# Patient Record
Sex: FEMALE | Race: BLACK OR AFRICAN AMERICAN | NOT HISPANIC OR LATINO | URBAN - METROPOLITAN AREA
[De-identification: names, ages, dates, MRNs, and addresses within clinical notes are randomized per-mention and may not be internally consistent; named-entity substitution may affect disease eponyms.]

---

## 2022-01-01 ENCOUNTER — INPATIENT (INPATIENT)
Facility: HOSPITAL | Age: 42
LOS: 0 days | DRG: 54 | End: 2022-05-09
Attending: NEUROLOGICAL SURGERY | Admitting: NEUROLOGICAL SURGERY
Payer: COMMERCIAL

## 2022-01-01 ENCOUNTER — EMERGENCY (EMERGENCY)
Facility: HOSPITAL | Age: 42
LOS: 1 days | Discharge: ANOTHER HOSPITAL | End: 2022-01-01
Attending: EMERGENCY MEDICINE
Payer: MEDICAID

## 2022-01-01 VITALS
OXYGEN SATURATION: 100 % | RESPIRATION RATE: 18 BRPM | WEIGHT: 149.91 LBS | SYSTOLIC BLOOD PRESSURE: 111 MMHG | DIASTOLIC BLOOD PRESSURE: 91 MMHG | HEART RATE: 94 BPM

## 2022-01-01 VITALS
RESPIRATION RATE: 12 BRPM | DIASTOLIC BLOOD PRESSURE: 81 MMHG | SYSTOLIC BLOOD PRESSURE: 115 MMHG | OXYGEN SATURATION: 100 % | HEART RATE: 102 BPM | TEMPERATURE: 99 F

## 2022-01-01 VITALS — HEART RATE: 102 BPM | RESPIRATION RATE: 20 BRPM | OXYGEN SATURATION: 99 %

## 2022-01-01 VITALS
SYSTOLIC BLOOD PRESSURE: 83 MMHG | OXYGEN SATURATION: 96 % | DIASTOLIC BLOOD PRESSURE: 52 MMHG | WEIGHT: 134.48 LBS | HEART RATE: 76 BPM | HEIGHT: 62 IN | RESPIRATION RATE: 16 BRPM

## 2022-01-01 DIAGNOSIS — G93.40 ENCEPHALOPATHY, UNSPECIFIED: ICD-10-CM

## 2022-01-01 DIAGNOSIS — Z71.89 OTHER SPECIFIED COUNSELING: ICD-10-CM

## 2022-01-01 DIAGNOSIS — G93.89 OTHER SPECIFIED DISORDERS OF BRAIN: ICD-10-CM

## 2022-01-01 DIAGNOSIS — R53.2 FUNCTIONAL QUADRIPLEGIA: ICD-10-CM

## 2022-01-01 DIAGNOSIS — Z51.5 ENCOUNTER FOR PALLIATIVE CARE: ICD-10-CM

## 2022-01-01 DIAGNOSIS — J96.01 ACUTE RESPIRATORY FAILURE WITH HYPOXIA: ICD-10-CM

## 2022-01-01 DIAGNOSIS — D32.9 BENIGN NEOPLASM OF MENINGES, UNSPECIFIED: ICD-10-CM

## 2022-01-01 LAB
A1C WITH ESTIMATED AVERAGE GLUCOSE RESULT: 5.4 % — SIGNIFICANT CHANGE UP (ref 4–5.6)
ALBUMIN SERPL ELPH-MCNC: 3.5 G/DL — SIGNIFICANT CHANGE UP (ref 3.5–5)
ALBUMIN SERPL ELPH-MCNC: 4.1 G/DL — SIGNIFICANT CHANGE UP (ref 3.3–5)
ALP SERPL-CCNC: 44 U/L — SIGNIFICANT CHANGE UP (ref 40–120)
ALP SERPL-CCNC: 50 U/L — SIGNIFICANT CHANGE UP (ref 40–120)
ALT FLD-CCNC: 18 U/L DA — SIGNIFICANT CHANGE UP (ref 10–60)
ALT FLD-CCNC: 18 U/L — SIGNIFICANT CHANGE UP (ref 10–45)
ANION GAP SERPL CALC-SCNC: 10 MMOL/L — SIGNIFICANT CHANGE UP (ref 5–17)
ANION GAP SERPL CALC-SCNC: 11 MMOL/L — SIGNIFICANT CHANGE UP (ref 5–17)
ANION GAP SERPL CALC-SCNC: 12 MMOL/L — SIGNIFICANT CHANGE UP (ref 5–17)
ANION GAP SERPL CALC-SCNC: 14 MMOL/L — SIGNIFICANT CHANGE UP (ref 5–17)
ANION GAP SERPL CALC-SCNC: 14 MMOL/L — SIGNIFICANT CHANGE UP (ref 5–17)
ANION GAP SERPL CALC-SCNC: 16 MMOL/L — SIGNIFICANT CHANGE UP (ref 5–17)
ANION GAP SERPL CALC-SCNC: 16 MMOL/L — SIGNIFICANT CHANGE UP (ref 5–17)
APTT BLD: 24.2 SEC — LOW (ref 27.5–35.5)
APTT BLD: 24.6 SEC — LOW (ref 27.5–35.5)
APTT BLD: 26.8 SEC — LOW (ref 27.5–35.5)
AST SERPL-CCNC: 30 U/L — SIGNIFICANT CHANGE UP (ref 10–40)
AST SERPL-CCNC: 34 U/L — SIGNIFICANT CHANGE UP (ref 10–40)
BASOPHILS # BLD AUTO: 0.03 K/UL — SIGNIFICANT CHANGE UP (ref 0–0.2)
BASOPHILS # BLD AUTO: 0.04 K/UL — SIGNIFICANT CHANGE UP (ref 0–0.2)
BASOPHILS NFR BLD AUTO: 0.2 % — SIGNIFICANT CHANGE UP (ref 0–2)
BASOPHILS NFR BLD AUTO: 0.4 % — SIGNIFICANT CHANGE UP (ref 0–2)
BILIRUB SERPL-MCNC: 0.4 MG/DL — SIGNIFICANT CHANGE UP (ref 0.2–1.2)
BILIRUB SERPL-MCNC: 0.5 MG/DL — SIGNIFICANT CHANGE UP (ref 0.2–1.2)
BLD GP AB SCN SERPL QL: NEGATIVE — SIGNIFICANT CHANGE UP
BUN SERPL-MCNC: 10 MG/DL — SIGNIFICANT CHANGE UP (ref 7–23)
BUN SERPL-MCNC: 11 MG/DL — SIGNIFICANT CHANGE UP (ref 7–18)
BUN SERPL-MCNC: 7 MG/DL — SIGNIFICANT CHANGE UP (ref 7–23)
BUN SERPL-MCNC: 8 MG/DL — SIGNIFICANT CHANGE UP (ref 7–23)
CALCIUM SERPL-MCNC: 7.4 MG/DL — LOW (ref 8.4–10.5)
CALCIUM SERPL-MCNC: 8.5 MG/DL — SIGNIFICANT CHANGE UP (ref 8.4–10.5)
CALCIUM SERPL-MCNC: 8.8 MG/DL — SIGNIFICANT CHANGE UP (ref 8.4–10.5)
CALCIUM SERPL-MCNC: 8.9 MG/DL — SIGNIFICANT CHANGE UP (ref 8.4–10.5)
CALCIUM SERPL-MCNC: 9 MG/DL — SIGNIFICANT CHANGE UP (ref 8.4–10.5)
CALCIUM SERPL-MCNC: 9.5 MG/DL — SIGNIFICANT CHANGE UP (ref 8.4–10.5)
CALCIUM SERPL-MCNC: 9.6 MG/DL — SIGNIFICANT CHANGE UP (ref 8.4–10.5)
CHLORIDE SERPL-SCNC: 104 MMOL/L — SIGNIFICANT CHANGE UP (ref 96–108)
CHLORIDE SERPL-SCNC: 110 MMOL/L — HIGH (ref 96–108)
CHLORIDE SERPL-SCNC: 121 MMOL/L — HIGH (ref 96–108)
CHLORIDE SERPL-SCNC: 123 MMOL/L — HIGH (ref 96–108)
CHLORIDE SERPL-SCNC: >135 MMOL/L — HIGH (ref 96–108)
CO2 SERPL-SCNC: 11 MMOL/L — LOW (ref 22–31)
CO2 SERPL-SCNC: 11 MMOL/L — LOW (ref 22–31)
CO2 SERPL-SCNC: 12 MMOL/L — LOW (ref 22–31)
CO2 SERPL-SCNC: 12 MMOL/L — LOW (ref 22–31)
CO2 SERPL-SCNC: 14 MMOL/L — LOW (ref 22–31)
CO2 SERPL-SCNC: 19 MMOL/L — LOW (ref 22–31)
CO2 SERPL-SCNC: <10 MMOL/L — CRITICAL LOW (ref 22–31)
CREAT SERPL-MCNC: 0.42 MG/DL — LOW (ref 0.5–1.3)
CREAT SERPL-MCNC: 0.47 MG/DL — LOW (ref 0.5–1.3)
CREAT SERPL-MCNC: 0.53 MG/DL — SIGNIFICANT CHANGE UP (ref 0.5–1.3)
CREAT SERPL-MCNC: 0.75 MG/DL — SIGNIFICANT CHANGE UP (ref 0.5–1.3)
CREAT SERPL-MCNC: 0.77 MG/DL — SIGNIFICANT CHANGE UP (ref 0.5–1.3)
CREAT SERPL-MCNC: 0.83 MG/DL — SIGNIFICANT CHANGE UP (ref 0.5–1.3)
CREAT SERPL-MCNC: 0.95 MG/DL — SIGNIFICANT CHANGE UP (ref 0.5–1.3)
EGFR: 101 ML/MIN/1.73M2 — SIGNIFICANT CHANGE UP
EGFR: 118 ML/MIN/1.73M2 — SIGNIFICANT CHANGE UP
EGFR: 121 ML/MIN/1.73M2 — SIGNIFICANT CHANGE UP
EGFR: 124 ML/MIN/1.73M2 — SIGNIFICANT CHANGE UP
EGFR: 76 ML/MIN/1.73M2 — SIGNIFICANT CHANGE UP
EGFR: 90 ML/MIN/1.73M2 — SIGNIFICANT CHANGE UP
EGFR: 98 ML/MIN/1.73M2 — SIGNIFICANT CHANGE UP
EOSINOPHIL # BLD AUTO: 0 K/UL — SIGNIFICANT CHANGE UP (ref 0–0.5)
EOSINOPHIL # BLD AUTO: 0.01 K/UL — SIGNIFICANT CHANGE UP (ref 0–0.5)
EOSINOPHIL NFR BLD AUTO: 0 % — SIGNIFICANT CHANGE UP (ref 0–6)
EOSINOPHIL NFR BLD AUTO: 0.1 % — SIGNIFICANT CHANGE UP (ref 0–6)
ESTIMATED AVERAGE GLUCOSE: 108 MG/DL — SIGNIFICANT CHANGE UP (ref 68–114)
ETHANOL SERPL-MCNC: <3 MG/DL — SIGNIFICANT CHANGE UP (ref 0–10)
GAS PNL BLDA: SIGNIFICANT CHANGE UP
GAS PNL BLDA: SIGNIFICANT CHANGE UP
GLUCOSE BLDC GLUCOMTR-MCNC: 189 MG/DL — HIGH (ref 70–99)
GLUCOSE BLDC GLUCOMTR-MCNC: 226 MG/DL — HIGH (ref 70–99)
GLUCOSE SERPL-MCNC: 141 MG/DL — HIGH (ref 70–99)
GLUCOSE SERPL-MCNC: 160 MG/DL — HIGH (ref 70–99)
GLUCOSE SERPL-MCNC: 189 MG/DL — HIGH (ref 70–99)
GLUCOSE SERPL-MCNC: 201 MG/DL — HIGH (ref 70–99)
GLUCOSE SERPL-MCNC: 260 MG/DL — HIGH (ref 70–99)
GLUCOSE SERPL-MCNC: 267 MG/DL — HIGH (ref 70–99)
GLUCOSE SERPL-MCNC: 528 MG/DL — CRITICAL HIGH (ref 70–99)
HCG SERPL-ACNC: <1 MIU/ML — SIGNIFICANT CHANGE UP
HCG SERPL-ACNC: <2 MIU/ML — SIGNIFICANT CHANGE UP
HCT VFR BLD CALC: 31.3 % — LOW (ref 34.5–45)
HCT VFR BLD CALC: 33.6 % — LOW (ref 34.5–45)
HCT VFR BLD CALC: 39.9 % — SIGNIFICANT CHANGE UP (ref 34.5–45)
HGB BLD-MCNC: 10.4 G/DL — LOW (ref 11.5–15.5)
HGB BLD-MCNC: 11.1 G/DL — LOW (ref 11.5–15.5)
HGB BLD-MCNC: 12.9 G/DL — SIGNIFICANT CHANGE UP (ref 11.5–15.5)
IMM GRANULOCYTES NFR BLD AUTO: 0.5 % — SIGNIFICANT CHANGE UP (ref 0–1.5)
IMM GRANULOCYTES NFR BLD AUTO: 0.6 % — SIGNIFICANT CHANGE UP (ref 0–1.5)
INR BLD: 1.03 RATIO — SIGNIFICANT CHANGE UP (ref 0.88–1.16)
INR BLD: 1.03 RATIO — SIGNIFICANT CHANGE UP (ref 0.88–1.16)
INR BLD: 1.06 RATIO — SIGNIFICANT CHANGE UP (ref 0.88–1.16)
LYMPHOCYTES # BLD AUTO: 0.65 K/UL — LOW (ref 1–3.3)
LYMPHOCYTES # BLD AUTO: 1.55 K/UL — SIGNIFICANT CHANGE UP (ref 1–3.3)
LYMPHOCYTES # BLD AUTO: 19.6 % — SIGNIFICANT CHANGE UP (ref 13–44)
LYMPHOCYTES # BLD AUTO: 3.9 % — LOW (ref 13–44)
MAGNESIUM SERPL-MCNC: 1.4 MG/DL — LOW (ref 1.6–2.6)
MAGNESIUM SERPL-MCNC: 2.1 MG/DL — SIGNIFICANT CHANGE UP (ref 1.6–2.6)
MAGNESIUM SERPL-MCNC: 2.2 MG/DL — SIGNIFICANT CHANGE UP (ref 1.6–2.6)
MAGNESIUM SERPL-MCNC: 2.3 MG/DL — SIGNIFICANT CHANGE UP (ref 1.6–2.6)
MCHC RBC-ENTMCNC: 28.5 PG — SIGNIFICANT CHANGE UP (ref 27–34)
MCHC RBC-ENTMCNC: 28.8 PG — SIGNIFICANT CHANGE UP (ref 27–34)
MCHC RBC-ENTMCNC: 28.8 PG — SIGNIFICANT CHANGE UP (ref 27–34)
MCHC RBC-ENTMCNC: 32.3 GM/DL — SIGNIFICANT CHANGE UP (ref 32–36)
MCHC RBC-ENTMCNC: 33 GM/DL — SIGNIFICANT CHANGE UP (ref 32–36)
MCHC RBC-ENTMCNC: 33.2 GM/DL — SIGNIFICANT CHANGE UP (ref 32–36)
MCV RBC AUTO: 86.7 FL — SIGNIFICANT CHANGE UP (ref 80–100)
MCV RBC AUTO: 87.3 FL — SIGNIFICANT CHANGE UP (ref 80–100)
MCV RBC AUTO: 88.1 FL — SIGNIFICANT CHANGE UP (ref 80–100)
MONOCYTES # BLD AUTO: 0.31 K/UL — SIGNIFICANT CHANGE UP (ref 0–0.9)
MONOCYTES # BLD AUTO: 0.57 K/UL — SIGNIFICANT CHANGE UP (ref 0–0.9)
MONOCYTES NFR BLD AUTO: 3.4 % — SIGNIFICANT CHANGE UP (ref 2–14)
MONOCYTES NFR BLD AUTO: 3.9 % — SIGNIFICANT CHANGE UP (ref 2–14)
NEUTROPHILS # BLD AUTO: 15.29 K/UL — HIGH (ref 1.8–7.4)
NEUTROPHILS # BLD AUTO: 5.97 K/UL — SIGNIFICANT CHANGE UP (ref 1.8–7.4)
NEUTROPHILS NFR BLD AUTO: 75.4 % — SIGNIFICANT CHANGE UP (ref 43–77)
NEUTROPHILS NFR BLD AUTO: 92 % — HIGH (ref 43–77)
NRBC # BLD: 0 /100 WBCS — SIGNIFICANT CHANGE UP (ref 0–0)
OSMOLALITY SERPL: 372 MOSMOL/KG — HIGH (ref 275–300)
PCP SPEC-MCNC: SIGNIFICANT CHANGE UP
PHOSPHATE SERPL-MCNC: 0.7 MG/DL — CRITICAL LOW (ref 2.5–4.5)
PHOSPHATE SERPL-MCNC: 0.8 MG/DL — CRITICAL LOW (ref 2.5–4.5)
PHOSPHATE SERPL-MCNC: 1.1 MG/DL — LOW (ref 2.5–4.5)
PLATELET # BLD AUTO: 234 K/UL — SIGNIFICANT CHANGE UP (ref 150–400)
PLATELET # BLD AUTO: 266 K/UL — SIGNIFICANT CHANGE UP (ref 150–400)
PLATELET # BLD AUTO: 296 K/UL — SIGNIFICANT CHANGE UP (ref 150–400)
POTASSIUM SERPL-MCNC: 3.2 MMOL/L — LOW (ref 3.5–5.3)
POTASSIUM SERPL-MCNC: 3.2 MMOL/L — LOW (ref 3.5–5.3)
POTASSIUM SERPL-MCNC: 3.3 MMOL/L — LOW (ref 3.5–5.3)
POTASSIUM SERPL-MCNC: 3.4 MMOL/L — LOW (ref 3.5–5.3)
POTASSIUM SERPL-MCNC: 3.5 MMOL/L — SIGNIFICANT CHANGE UP (ref 3.5–5.3)
POTASSIUM SERPL-MCNC: 4.6 MMOL/L — SIGNIFICANT CHANGE UP (ref 3.5–5.3)
POTASSIUM SERPL-MCNC: 6.2 MMOL/L — CRITICAL HIGH (ref 3.5–5.3)
POTASSIUM SERPL-SCNC: 3.2 MMOL/L — LOW (ref 3.5–5.3)
POTASSIUM SERPL-SCNC: 3.2 MMOL/L — LOW (ref 3.5–5.3)
POTASSIUM SERPL-SCNC: 3.3 MMOL/L — LOW (ref 3.5–5.3)
POTASSIUM SERPL-SCNC: 3.4 MMOL/L — LOW (ref 3.5–5.3)
POTASSIUM SERPL-SCNC: 3.5 MMOL/L — SIGNIFICANT CHANGE UP (ref 3.5–5.3)
POTASSIUM SERPL-SCNC: 4.6 MMOL/L — SIGNIFICANT CHANGE UP (ref 3.5–5.3)
POTASSIUM SERPL-SCNC: 6.2 MMOL/L — CRITICAL HIGH (ref 3.5–5.3)
PROT SERPL-MCNC: 6.4 G/DL — SIGNIFICANT CHANGE UP (ref 6–8.3)
PROT SERPL-MCNC: 7 G/DL — SIGNIFICANT CHANGE UP (ref 6–8.3)
PROTHROM AB SERPL-ACNC: 11.8 SEC — SIGNIFICANT CHANGE UP (ref 10.5–13.4)
PROTHROM AB SERPL-ACNC: 12.2 SEC — SIGNIFICANT CHANGE UP (ref 10.5–13.4)
PROTHROM AB SERPL-ACNC: 12.3 SEC — SIGNIFICANT CHANGE UP (ref 10.5–13.4)
RBC # BLD: 3.61 M/UL — LOW (ref 3.8–5.2)
RBC # BLD: 3.85 M/UL — SIGNIFICANT CHANGE UP (ref 3.8–5.2)
RBC # BLD: 4.53 M/UL — SIGNIFICANT CHANGE UP (ref 3.8–5.2)
RBC # FLD: 13.2 % — SIGNIFICANT CHANGE UP (ref 10.3–14.5)
RH IG SCN BLD-IMP: POSITIVE — SIGNIFICANT CHANGE UP
SARS-COV-2 RNA SPEC QL NAA+PROBE: SIGNIFICANT CHANGE UP
SARS-COV-2 RNA SPEC QL NAA+PROBE: SIGNIFICANT CHANGE UP
SODIUM SERPL-SCNC: 133 MMOL/L — LOW (ref 135–145)
SODIUM SERPL-SCNC: 140 MMOL/L — SIGNIFICANT CHANGE UP (ref 135–145)
SODIUM SERPL-SCNC: 147 MMOL/L — HIGH (ref 135–145)
SODIUM SERPL-SCNC: 149 MMOL/L — HIGH (ref 135–145)
SODIUM SERPL-SCNC: >170 MMOL/L — CRITICAL HIGH (ref 135–145)
SP GR UR STRIP: 1.01 — LOW (ref 1.01–1.02)
WBC # BLD: 16.44 K/UL — HIGH (ref 3.8–10.5)
WBC # BLD: 16.63 K/UL — HIGH (ref 3.8–10.5)
WBC # BLD: 7.92 K/UL — SIGNIFICANT CHANGE UP (ref 3.8–10.5)
WBC # FLD AUTO: 16.44 K/UL — HIGH (ref 3.8–10.5)
WBC # FLD AUTO: 16.63 K/UL — HIGH (ref 3.8–10.5)
WBC # FLD AUTO: 7.92 K/UL — SIGNIFICANT CHANGE UP (ref 3.8–10.5)

## 2022-01-01 PROCEDURE — 99223 1ST HOSP IP/OBS HIGH 75: CPT

## 2022-01-01 PROCEDURE — 82962 GLUCOSE BLOOD TEST: CPT

## 2022-01-01 PROCEDURE — 95718 EEG PHYS/QHP 2-12 HR W/VEEG: CPT

## 2022-01-01 PROCEDURE — 36620 INSERTION CATHETER ARTERY: CPT

## 2022-01-01 PROCEDURE — 71045 X-RAY EXAM CHEST 1 VIEW: CPT | Mod: 26

## 2022-01-01 PROCEDURE — 71045 X-RAY EXAM CHEST 1 VIEW: CPT

## 2022-01-01 PROCEDURE — 96375 TX/PRO/DX INJ NEW DRUG ADDON: CPT | Mod: XU

## 2022-01-01 PROCEDURE — 86901 BLOOD TYPING SEROLOGIC RH(D): CPT

## 2022-01-01 PROCEDURE — 85025 COMPLETE CBC W/AUTO DIFF WBC: CPT

## 2022-01-01 PROCEDURE — 99291 CRITICAL CARE FIRST HOUR: CPT | Mod: 25

## 2022-01-01 PROCEDURE — 70450 CT HEAD/BRAIN W/O DYE: CPT | Mod: 26,MA,77

## 2022-01-01 PROCEDURE — 70498 CT ANGIOGRAPHY NECK: CPT | Mod: 26,MA

## 2022-01-01 PROCEDURE — 70496 CT ANGIOGRAPHY HEAD: CPT | Mod: 26,MA

## 2022-01-01 PROCEDURE — 84702 CHORIONIC GONADOTROPIN TEST: CPT

## 2022-01-01 PROCEDURE — 99291 CRITICAL CARE FIRST HOUR: CPT

## 2022-01-01 PROCEDURE — 31500 INSERT EMERGENCY AIRWAY: CPT

## 2022-01-01 PROCEDURE — 85610 PROTHROMBIN TIME: CPT

## 2022-01-01 PROCEDURE — 86900 BLOOD TYPING SEROLOGIC ABO: CPT

## 2022-01-01 PROCEDURE — 70450 CT HEAD/BRAIN W/O DYE: CPT | Mod: MA

## 2022-01-01 PROCEDURE — 93010 ELECTROCARDIOGRAM REPORT: CPT

## 2022-01-01 PROCEDURE — 96374 THER/PROPH/DIAG INJ IV PUSH: CPT | Mod: XU

## 2022-01-01 PROCEDURE — 93970 EXTREMITY STUDY: CPT | Mod: 26

## 2022-01-01 PROCEDURE — 70450 CT HEAD/BRAIN W/O DYE: CPT | Mod: 26

## 2022-01-01 PROCEDURE — 85730 THROMBOPLASTIN TIME PARTIAL: CPT

## 2022-01-01 PROCEDURE — 36415 COLL VENOUS BLD VENIPUNCTURE: CPT

## 2022-01-01 PROCEDURE — 80307 DRUG TEST PRSMV CHEM ANLYZR: CPT

## 2022-01-01 PROCEDURE — 87635 SARS-COV-2 COVID-19 AMP PRB: CPT

## 2022-01-01 PROCEDURE — 80053 COMPREHEN METABOLIC PANEL: CPT

## 2022-01-01 PROCEDURE — 70498 CT ANGIOGRAPHY NECK: CPT | Mod: MA

## 2022-01-01 PROCEDURE — 70496 CT ANGIOGRAPHY HEAD: CPT | Mod: MA

## 2022-01-01 PROCEDURE — 93005 ELECTROCARDIOGRAM TRACING: CPT

## 2022-01-01 PROCEDURE — 86850 RBC ANTIBODY SCREEN: CPT

## 2022-01-01 RX ORDER — MORPHINE SULFATE 50 MG/1
2 CAPSULE, EXTENDED RELEASE ORAL
Refills: 0 | Status: DISCONTINUED | OUTPATIENT
Start: 2022-01-01 | End: 2022-01-01

## 2022-01-01 RX ORDER — INSULIN LISPRO 100/ML
VIAL (ML) SUBCUTANEOUS EVERY 6 HOURS
Refills: 0 | Status: DISCONTINUED | OUTPATIENT
Start: 2022-01-01 | End: 2022-01-01

## 2022-01-01 RX ORDER — NOREPINEPHRINE BITARTRATE/D5W 8 MG/250ML
0.05 PLASTIC BAG, INJECTION (ML) INTRAVENOUS
Qty: 8 | Refills: 0 | Status: DISCONTINUED | OUTPATIENT
Start: 2022-01-01 | End: 2022-05-12

## 2022-01-01 RX ORDER — SODIUM CHLORIDE 9 MG/ML
1000 INJECTION, SOLUTION INTRAVENOUS
Refills: 0 | Status: DISCONTINUED | OUTPATIENT
Start: 2022-01-01 | End: 2022-01-01

## 2022-01-01 RX ORDER — CHLORHEXIDINE GLUCONATE 213 G/1000ML
15 SOLUTION TOPICAL EVERY 12 HOURS
Refills: 0 | Status: DISCONTINUED | OUTPATIENT
Start: 2022-01-01 | End: 2022-01-01

## 2022-01-01 RX ORDER — ROCURONIUM BROMIDE 10 MG/ML
70 VIAL (ML) INTRAVENOUS ONCE
Refills: 0 | Status: COMPLETED | OUTPATIENT
Start: 2022-01-01 | End: 2022-01-01

## 2022-01-01 RX ORDER — ETOMIDATE 2 MG/ML
20 INJECTION INTRAVENOUS ONCE
Refills: 0 | Status: COMPLETED | OUTPATIENT
Start: 2022-01-01 | End: 2022-01-01

## 2022-01-01 RX ORDER — PHENYLEPHRINE HYDROCHLORIDE 10 MG/ML
0.2 INJECTION INTRAVENOUS
Qty: 40 | Refills: 0 | Status: DISCONTINUED | OUTPATIENT
Start: 2022-01-01 | End: 2022-01-01

## 2022-01-01 RX ORDER — PHENYLEPHRINE HYDROCHLORIDE 10 MG/ML
4.7 INJECTION INTRAVENOUS
Qty: 160 | Refills: 0 | Status: DISCONTINUED | OUTPATIENT
Start: 2022-01-01 | End: 2022-01-01

## 2022-01-01 RX ORDER — SODIUM CHLORIDE 9 MG/ML
2000 INJECTION INTRAMUSCULAR; INTRAVENOUS; SUBCUTANEOUS ONCE
Refills: 0 | Status: COMPLETED | OUTPATIENT
Start: 2022-01-01 | End: 2022-01-01

## 2022-01-01 RX ORDER — CHLORHEXIDINE GLUCONATE 213 G/1000ML
1 SOLUTION TOPICAL
Refills: 0 | Status: DISCONTINUED | OUTPATIENT
Start: 2022-01-01 | End: 2022-01-01

## 2022-01-01 RX ORDER — SODIUM CHLORIDE 9 MG/ML
1000 INJECTION INTRAMUSCULAR; INTRAVENOUS; SUBCUTANEOUS
Refills: 0 | Status: DISCONTINUED | OUTPATIENT
Start: 2022-01-01 | End: 2022-01-01

## 2022-01-01 RX ORDER — DEXAMETHASONE 0.5 MG/5ML
10 ELIXIR ORAL ONCE
Refills: 0 | Status: DISCONTINUED | OUTPATIENT
Start: 2022-01-01 | End: 2022-01-01

## 2022-01-01 RX ORDER — DEXAMETHASONE 0.5 MG/5ML
6 ELIXIR ORAL ONCE
Refills: 0 | Status: COMPLETED | OUTPATIENT
Start: 2022-01-01 | End: 2022-01-01

## 2022-01-01 RX ORDER — PHENYLEPHRINE HYDROCHLORIDE 10 MG/ML
5.1 INJECTION INTRAVENOUS
Qty: 160 | Refills: 0 | Status: DISCONTINUED | OUTPATIENT
Start: 2022-01-01 | End: 2022-01-01

## 2022-01-01 RX ORDER — MANNITOL
25 POWDER (GRAM) MISCELLANEOUS ONCE
Refills: 0 | Status: COMPLETED | OUTPATIENT
Start: 2022-01-01 | End: 2022-01-01

## 2022-01-01 RX ORDER — DEXTROSE 50 % IN WATER 50 %
15 SYRINGE (ML) INTRAVENOUS ONCE
Refills: 0 | Status: DISCONTINUED | OUTPATIENT
Start: 2022-01-01 | End: 2022-01-01

## 2022-01-01 RX ORDER — DEXAMETHASONE 0.5 MG/5ML
10 ELIXIR ORAL EVERY 6 HOURS
Refills: 0 | Status: DISCONTINUED | OUTPATIENT
Start: 2022-01-01 | End: 2022-01-01

## 2022-01-01 RX ORDER — POTASSIUM PHOSPHATE, MONOBASIC POTASSIUM PHOSPHATE, DIBASIC 236; 224 MG/ML; MG/ML
30 INJECTION, SOLUTION INTRAVENOUS ONCE
Refills: 0 | Status: COMPLETED | OUTPATIENT
Start: 2022-01-01 | End: 2022-01-01

## 2022-01-01 RX ORDER — DEXTROSE 50 % IN WATER 50 %
25 SYRINGE (ML) INTRAVENOUS ONCE
Refills: 0 | Status: DISCONTINUED | OUTPATIENT
Start: 2022-01-01 | End: 2022-01-01

## 2022-01-01 RX ORDER — LEVETIRACETAM 250 MG/1
1000 TABLET, FILM COATED ORAL ONCE
Refills: 0 | Status: COMPLETED | OUTPATIENT
Start: 2022-01-01 | End: 2022-01-01

## 2022-01-01 RX ORDER — GLUCAGON INJECTION, SOLUTION 0.5 MG/.1ML
1 INJECTION, SOLUTION SUBCUTANEOUS ONCE
Refills: 0 | Status: DISCONTINUED | OUTPATIENT
Start: 2022-01-01 | End: 2022-01-01

## 2022-01-01 RX ORDER — DEXTROSE 50 % IN WATER 50 %
12.5 SYRINGE (ML) INTRAVENOUS ONCE
Refills: 0 | Status: DISCONTINUED | OUTPATIENT
Start: 2022-01-01 | End: 2022-01-01

## 2022-01-01 RX ORDER — SODIUM CHLORIDE 5 G/100ML
1000 INJECTION, SOLUTION INTRAVENOUS
Refills: 0 | Status: DISCONTINUED | OUTPATIENT
Start: 2022-01-01 | End: 2022-01-01

## 2022-01-01 RX ORDER — SODIUM CHLORIDE 9 MG/ML
1000 INJECTION INTRAMUSCULAR; INTRAVENOUS; SUBCUTANEOUS ONCE
Refills: 0 | Status: COMPLETED | OUTPATIENT
Start: 2022-01-01 | End: 2022-01-01

## 2022-01-01 RX ORDER — DEXAMETHASONE 0.5 MG/5ML
10 ELIXIR ORAL ONCE
Refills: 0 | Status: COMPLETED | OUTPATIENT
Start: 2022-01-01 | End: 2022-01-01

## 2022-01-01 RX ORDER — NOREPINEPHRINE BITARTRATE/D5W 8 MG/250ML
0.1 PLASTIC BAG, INJECTION (ML) INTRAVENOUS
Qty: 8 | Refills: 0 | Status: DISCONTINUED | OUTPATIENT
Start: 2022-01-01 | End: 2022-01-01

## 2022-01-01 RX ORDER — SODIUM CHLORIDE 5 G/100ML
50 INJECTION, SOLUTION INTRAVENOUS
Refills: 0 | Status: DISCONTINUED | OUTPATIENT
Start: 2022-01-01 | End: 2022-01-01

## 2022-01-01 RX ORDER — VASOPRESSIN 20 [USP'U]/ML
0.04 INJECTION INTRAVENOUS
Qty: 50 | Refills: 0 | Status: DISCONTINUED | OUTPATIENT
Start: 2022-01-01 | End: 2022-01-01

## 2022-01-01 RX ORDER — SODIUM CHLORIDE 9 MG/ML
1500 INJECTION, SOLUTION INTRAVENOUS
Refills: 0 | Status: COMPLETED | OUTPATIENT
Start: 2022-01-01 | End: 2022-01-01

## 2022-01-01 RX ORDER — PROPOFOL 10 MG/ML
40 INJECTION, EMULSION INTRAVENOUS
Qty: 1000 | Refills: 0 | Status: DISCONTINUED | OUTPATIENT
Start: 2022-01-01 | End: 2022-01-01

## 2022-01-01 RX ORDER — LEVETIRACETAM 250 MG/1
1000 TABLET, FILM COATED ORAL EVERY 12 HOURS
Refills: 0 | Status: DISCONTINUED | OUTPATIENT
Start: 2022-01-01 | End: 2022-01-01

## 2022-01-01 RX ORDER — ROBINUL 0.2 MG/ML
0.4 INJECTION INTRAMUSCULAR; INTRAVENOUS EVERY 6 HOURS
Refills: 0 | Status: DISCONTINUED | OUTPATIENT
Start: 2022-01-01 | End: 2022-01-01

## 2022-01-01 RX ORDER — PANTOPRAZOLE SODIUM 20 MG/1
40 TABLET, DELAYED RELEASE ORAL DAILY
Refills: 0 | Status: DISCONTINUED | OUTPATIENT
Start: 2022-01-01 | End: 2022-01-01

## 2022-01-01 RX ORDER — SENNA PLUS 8.6 MG/1
2 TABLET ORAL AT BEDTIME
Refills: 0 | Status: DISCONTINUED | OUTPATIENT
Start: 2022-01-01 | End: 2022-01-01

## 2022-01-01 RX ORDER — DEXAMETHASONE 0.5 MG/5ML
10 ELIXIR ORAL EVERY 4 HOURS
Refills: 0 | Status: DISCONTINUED | OUTPATIENT
Start: 2022-01-01 | End: 2022-01-01

## 2022-01-01 RX ADMIN — VASOPRESSIN 2.4 UNIT(S)/MIN: 20 INJECTION INTRAVENOUS at 07:35

## 2022-01-01 RX ADMIN — Medication 102 MILLIGRAM(S): at 09:56

## 2022-01-01 RX ADMIN — Medication 6 MILLIGRAM(S): at 15:50

## 2022-01-01 RX ADMIN — Medication 2: at 00:10

## 2022-01-01 RX ADMIN — POTASSIUM PHOSPHATE, MONOBASIC POTASSIUM PHOSPHATE, DIBASIC 83.33 MILLIMOLE(S): 236; 224 INJECTION, SOLUTION INTRAVENOUS at 01:33

## 2022-01-01 RX ADMIN — SODIUM CHLORIDE 1000 MILLILITER(S): 9 INJECTION INTRAMUSCULAR; INTRAVENOUS; SUBCUTANEOUS at 01:06

## 2022-01-01 RX ADMIN — SODIUM CHLORIDE 1500 MILLILITER(S): 9 INJECTION, SOLUTION INTRAVENOUS at 11:47

## 2022-01-01 RX ADMIN — CHLORHEXIDINE GLUCONATE 1 APPLICATION(S): 213 SOLUTION TOPICAL at 23:42

## 2022-01-01 RX ADMIN — Medication 10 MILLIGRAM(S): at 18:00

## 2022-01-01 RX ADMIN — SODIUM CHLORIDE 2000 MILLILITER(S): 9 INJECTION INTRAMUSCULAR; INTRAVENOUS; SUBCUTANEOUS at 16:45

## 2022-01-01 RX ADMIN — Medication 500 GRAM(S): at 18:51

## 2022-01-01 RX ADMIN — PROPOFOL 16.3 MICROGRAM(S)/KG/MIN: 10 INJECTION, EMULSION INTRAVENOUS at 07:35

## 2022-01-01 RX ADMIN — Medication 10 MILLIGRAM(S): at 18:30

## 2022-01-01 RX ADMIN — Medication 2: at 11:54

## 2022-01-01 RX ADMIN — SODIUM CHLORIDE 500 MILLILITER(S): 5 INJECTION, SOLUTION INTRAVENOUS at 18:50

## 2022-01-01 RX ADMIN — PROPOFOL 16.3 MICROGRAM(S)/KG/MIN: 10 INJECTION, EMULSION INTRAVENOUS at 02:11

## 2022-01-01 RX ADMIN — CHLORHEXIDINE GLUCONATE 15 MILLILITER(S): 213 SOLUTION TOPICAL at 05:18

## 2022-01-01 RX ADMIN — Medication 12.8 MICROGRAM(S)/KG/MIN: at 19:38

## 2022-01-01 RX ADMIN — LEVETIRACETAM 400 MILLIGRAM(S): 250 TABLET, FILM COATED ORAL at 17:14

## 2022-01-01 RX ADMIN — Medication 102 MILLIGRAM(S): at 07:15

## 2022-01-01 RX ADMIN — PANTOPRAZOLE SODIUM 40 MILLIGRAM(S): 20 TABLET, DELAYED RELEASE ORAL at 11:47

## 2022-01-01 RX ADMIN — Medication 2 MILLIGRAM(S): at 15:25

## 2022-01-01 RX ADMIN — LEVETIRACETAM 400 MILLIGRAM(S): 250 TABLET, FILM COATED ORAL at 05:18

## 2022-01-01 RX ADMIN — Medication 102 MILLIGRAM(S): at 03:15

## 2022-01-01 RX ADMIN — Medication 102 MILLIGRAM(S): at 14:09

## 2022-01-01 RX ADMIN — Medication 70 MILLIGRAM(S): at 16:18

## 2022-01-01 RX ADMIN — VASOPRESSIN 2.4 UNIT(S)/MIN: 20 INJECTION INTRAVENOUS at 06:58

## 2022-01-01 RX ADMIN — SODIUM CHLORIDE 2000 MILLILITER(S): 9 INJECTION INTRAMUSCULAR; INTRAVENOUS; SUBCUTANEOUS at 15:25

## 2022-01-01 RX ADMIN — Medication 5.72 MICROGRAM(S)/KG/MIN: at 18:12

## 2022-01-01 RX ADMIN — SODIUM CHLORIDE 75 MILLILITER(S): 5 INJECTION, SOLUTION INTRAVENOUS at 23:41

## 2022-01-01 RX ADMIN — ETOMIDATE 20 MILLIGRAM(S): 2 INJECTION INTRAVENOUS at 16:17

## 2022-01-01 RX ADMIN — LEVETIRACETAM 400 MILLIGRAM(S): 250 TABLET, FILM COATED ORAL at 18:53

## 2022-01-01 RX ADMIN — Medication 102 MILLIGRAM(S): at 23:49

## 2022-01-01 RX ADMIN — PHENYLEPHRINE HYDROCHLORIDE 59.9 MICROGRAM(S)/KG/MIN: 10 INJECTION INTRAVENOUS at 07:35

## 2022-01-01 RX ADMIN — PHENYLEPHRINE HYDROCHLORIDE 59.9 MICROGRAM(S)/KG/MIN: 10 INJECTION INTRAVENOUS at 06:32

## 2022-01-01 RX ADMIN — PHENYLEPHRINE HYDROCHLORIDE 5.1 MICROGRAM(S)/KG/MIN: 10 INJECTION INTRAVENOUS at 22:55

## 2022-05-08 NOTE — ED PROVIDER NOTE - OBJECTIVE STATEMENT
42 y/o woman, h/o possible migraine headaches (validity of Dx uncertain), BIB EMS for possible new onset seizure just prior to arrival, as per Pt's close friend, Mary Faustin, who was visiting Pt from out of town, she was having headache this morning, EMS called to house but she refused medical attention, then just prior to arrival to ED Ms. Faustin found Pt lying on her bed snoring heavily with AMS and vomiting, urinary incontinence, after she had just stepped out of the room for about 3 minutes.  No shaking witnessed but Ms. Faustin says that Pt was very "tensed up" in the fetal position on the bed.  There was no evidence of trauma, alcohol/drug use.  No recent fever/focal weakness/numbness.  No known h/o seizure.

## 2022-05-08 NOTE — PROGRESS NOTE ADULT - SUBJECTIVE AND OBJECTIVE BOX
HPI:  41F past medical history of migraines who presented with a witnessed seizure this morning. No evidence of trauma. Was intubated at OSH for cheyne-sokes respirations and placed on levo for hypotension. CT showed large anterior cranial fossa mass with edema. Patient has poor neurologic exam consistent with brain death vs. active seizures. Being admitted to neurosurgical ICU for further care.  (08 May 2022 20:16)    On admission, the patient was:  GCS: 3T    *** HIGH RISK OF DVT PRESENT ON ADMISSION ***    VITALS:  T(C): --  HR:  (82 - 112)  BP:  (60/36 - 119/91)  ABP: --  RR:  (16 - 22)  SpO2:  (100% - 100%)  Wt(kg): --  Device: Avea, Mode: AC/ CMV (Assist Control/ Continuous Mandatory Ventilation), RR (machine): 16, TV (machine): 400, FiO2: 100, PEEP: 5, ITime: 1, MAP: 9, PIP: 18    LABS:  Na: 140 (05-08 @ 18:25)  K: 3.5 (05-08 @ 18:25)  Cl: 110 (05-08 @ 18:25)  CO2: 14 (05-08 @ 18:25)  BUN: 8 (05-08 @ 18:25)  Cr: 0.42 (05-08 @ 18:25)  Glu: 189(05-08 @ 18:25)    Hgb: 11.1 (05-08 @ 18:25)  Hct: 33.6 (05-08 @ 18:25)  WBC: 16.63 (05-08 @ 18:25)  Plt: 234 (05-08 @ 18:25)  PT: 12.2 (05-08 @ 18:25)  INR: 1.06 (05-08 @ 18:25)  aPTT: 24.2 (05-08 @ 18:25)    IMAGING:   Recent imaging studies were reviewed.    MEDICATIONS:  bisacodyl 5 milliGRAM(s) Oral daily PRN  chlorhexidine 0.12% Liquid 15 milliLiter(s) Oral Mucosa every 12 hours  dexAMETHasone  Injectable 10 milliGRAM(s) IV Push Once  dexAMETHasone  IVPB 10 milliGRAM(s) IV Intermittent every 4 hours  norepinephrine Infusion 0.1 MICROgram(s)/kG/Min IV Continuous <Continuous>  pantoprazole  Injectable 40 milliGRAM(s) IV Push daily  senna 2 Tablet(s) Oral at bedtime PRN  sodium chloride 3%. 50 milliLiter(s) IV Continuous <Continuous>    EXAMINATION:  General:  comatose  HEENT:  MMM  Neuro:  does not open eyes, does not follow, no corneals, no cough, no gag, flaccid x 4, intermittently over-breathes  Cards:  RRR  Respiratory:  intubated  Adomen:  soft  Extremities:  no edema  Skin:  warm/dry

## 2022-05-08 NOTE — ED PROVIDER NOTE - OBJECTIVE STATEMENT
MRN [id number]    40 y/o woman, h/o possible migraine headaches (validity of Dx uncertain), BIB EMS for possible new onset seizure just prior to arrival, as per Pt's close friend, Mary Faustin, who was visiting Pt from out of town, she was having headache this morning, EMS called to house but she refused medical attention, then just prior to arrival to ED Ms. Faustin found Pt lying on her bed snoring heavily with AMS and vomiting, urinary incontinence, after she had just stepped out of the room for about 3 minutes. No shaking witnessed but Ms. Faustin says that Pt was very "tensed up" in the fetal position on the bed. There was no evidence of trauma, alcohol/drug use. No recent fever/focal weakness/numbness. No known h/o seizure.    [facility] CT: 5.8 X 4.8 X 3.6 CM MASS ALONG THE FLOOR OF THE ANTERIOR CRANIAL FOSSA EXTENDING INTO THE INTERHEMISPHERIC FISSURE. MODERATE TO LARGE AMOUNT OF EDEMA WITHIN THE FRONTAL LOBES. SIGNIFICANT ASSOCIATED MASS EFFECT. NO HEMODYNAMIC SIGNIFICANT NARROWING WITHIN THE NECK. NO PROXIMAL LARGE VESSEL OCCLUSION INTRACRANIALLY.    Patient received 6mg Decadron, 20mg Etomidate, 1g Keppra, 2mg Atrivan, 70mg Rocuronium. She is intubated with 7.5mm tube at 23cm lip line. She is on Levophed at 0.15, not sedated.

## 2022-05-08 NOTE — ED PROVIDER NOTE - NEUROLOGICAL, MLM
Altered, drowsy, protecting airway, nonverbal and disoriented, no extremity movements with noxious stimuli

## 2022-05-08 NOTE — ED PROVIDER NOTE - CARE PLAN
Principal Discharge DX:	Brain mass  Secondary Diagnosis:	New onset seizure  Secondary Diagnosis:	Altered mental status, unspecified   1

## 2022-05-08 NOTE — H&P ADULT - NSHPPHYSICALEXAM_GEN_ALL_CORE
No eyes open, pupils 8mm and non reactive, no corneal reflex, no cough or gag reflexes, no movement in all four extremities to noxious stimuli.

## 2022-05-08 NOTE — ED PROVIDER NOTE - CONSTITUTIONAL, MLM
normal... altered, nonverbal, disoriented to person, place, time/situation and in no apparent distress.

## 2022-05-08 NOTE — ED PROVIDER NOTE - PROGRESS NOTE DETAILS
Transfer center connected me to NS, Dr. Dykes, and he accepts transfer to ED at [facility].  I also d/w ED attending at [facility].  D/w extensively with Pt's close friend in the ED, Mary Faustin, and with Pt's mother on the phone. Transfer center connected me to NSx, Dr. Armijo, and he accepts transfer to ED at [facility].  I also d/w ED attending, Dr. Winters, at [facility].  D/w extensively with Pt's close friend in the ED, Mary Faustin, and with Pt's mother on the phone.

## 2022-05-08 NOTE — ED ADULT NURSE NOTE - OBJECTIVE STATEMENT
41 y female transfer from [facility] presents to the ED for unresponsiveness.  As per EMS, PMH of migraines presented to the ED fro headache, sized and became unresponsive.  pt intubated at 4:30 using etomidate and rocuronium with a 7.5 sized tube 23 at the lip. CT revealed frontal brain mass. pt also received Keppra and ativan at [facility] ED and on levophed drip at 0.15 mcg/kg upon arrival to [facility]. Upon assessment pt on ventilator.  unresponsive to verbal commands and to pain, pupils fixed and dilated. 41 y female transfer from [facility] presents to the ED for unresponsiveness.  As per EMS, PMH of migraines presented to the ED for headache, seized and became unresponsive.  pt intubated at 16:30 using etomidate and rocuronium with a 7.5 ETT located at 23 at the lip. CT revealed new frontal brain mass. pt also received Keppra and ativan at [facility] ED and arrived on levophed drip at 0.15 mcg/kg upon arrival to [facility]. Upon assessment pt on ventilator, unresponsive to verbal commands and to pain, pupils fixed and dilated.

## 2022-05-08 NOTE — ED ADULT NURSE NOTE - NSIMPLEMENTINTERV_GEN_ALL_ED
Implemented All Fall with Harm Risk Interventions:  [place] to call system. Call bell, personal items and telephone within reach. Instruct patient to call for assistance. Room bathroom lighting operational. Non-slip footwear when patient is off stretcher. Physically safe environment: no spills, clutter or unnecessary equipment. Stretcher in lowest position, wheels locked, appropriate side rails in place. Provide visual cue, wrist band, yellow gown, etc. Monitor gait and stability. Monitor for mental status changes and reorient to person, place, and time. Review medications for side effects contributing to fall risk. Reinforce activity limits and safety measures with patient and family. Provide visual clues: red socks.

## 2022-05-08 NOTE — ED PROVIDER NOTE - PHYSICAL EXAMINATION
GENERAL: unresponsive  HEENT: NCAT  RESP: CTAB, intubated 7.5mm tube 23cm lip line  CV: RRR, no murmurs/rubs/gallops  ABDOMEN: soft, non-tender, non-distended, no guarding  MSK: no visible deformities  NEURO: unresponsive, fixed dilated pupils, no withdrawal to pain, not sedated  SKIN: warm, normal color, well perfused, no rash  PSYCH: unresponsive

## 2022-05-08 NOTE — PROCEDURE NOTE - NSINFORMCONSENT_GEN_A_CORE
Verbal consent on phone with Mom Alvina Martell/Benefits, risks, and possible complications of procedure explained to patient/caregiver who verbalized understanding and gave verbal consent.

## 2022-05-08 NOTE — ED ADULT NURSE NOTE - OBJECTIVE STATEMENT
biba with c/o altered mental status and hypotension, on arrival patient noted with seizures, Ativan 2 mg given, iv established and fluid started  and accompanied to CT, patient had a short span of SVT which was spontaneously resolved, noted hypoxic, bag and mask ventilation provided followed by intubation. ET 7.0 @ 23 cm R lip, hypotensive started on Norepinephrine peripherally. continuing on cardiac monitor, awaiting transfer team

## 2022-05-08 NOTE — H&P ADULT - NSHPLABSRESULTS_GEN_ALL_CORE
.  LABS:                         11.1   16.63 )-----------( 234      ( 08 May 2022 18:25 )             33.6     05-08    140  |  110<H>  |  8   ----------------------------<  189<H>  3.5   |  14<L>  |  0.42<L>    Ca    7.4<L>      08 May 2022 18:25  Mg     1.4     05-08    TPro  6.4  /  Alb  4.1  /  TBili  0.5  /  DBili  x   /  AST  34  /  ALT  18  /  AlkPhos  50  05-08    PT/INR - ( 08 May 2022 18:25 )   PT: 12.2 sec;   INR: 1.06 ratio         PTT - ( 08 May 2022 18:25 )  PTT:24.2 sec          RADIOLOGY, EKG & ADDITIONAL TESTS: Reviewed.

## 2022-05-08 NOTE — ED PROVIDER NOTE - PROGRESS NOTE DETAILS
Dutch Abdi, PGY3: Nsgy requesting additional 20mg Decadron total, 3% NS, additional 1g Keppra, and Mannitol. Awaiting repeat head CT, delayed due to 2 code strokes.

## 2022-05-08 NOTE — ED PROVIDER NOTE - RESPIRATORY, MLM
Irregular respiratory pattern indicative of Cheyne Trevino, Breath sounds clear and equal bilaterally.

## 2022-05-08 NOTE — ED PROVIDER NOTE - CLINICAL SUMMARY MEDICAL DECISION MAKING FREE TEXT BOX
42 y/o woman, h/o possible migraine headaches (validity of Dx uncertain), BIB EMS for possible new onset seizure just prior to arrival, as per Pt's close friend, Mary Faustin, who was visiting Pt from out of town, she was having headache this morning, EMS called to house but she refused medical attention, then just prior to arrival to ED Ms. Faustin found Pt lying on her bed snoring heavily with AMS and vomiting, urinary incontinence, after she had just stepped out of the room for about 3 minutes.  No shaking witnessed but Ms. Faustin says that Pt was very "tensed up" in the fetal position on the bed--Pt protecting airway upon initial eval in ED, immediate CT head/CTA head/neck done which showed frontal brain mass with mass effect and flattening of brainstem.  Pt remaining altered and appearing to have Cheyne Trevino respirations, pulse ox dropping, so Pt intubated with RSI meds, etomidate and rocuronium.  Pt also had episode of SVT which resolved spontaneously after about 1-2 minutes before any adenosine could be administered.  BP low after intubation, IVF boluses infusing.  With no immediate improvement, Levophed started peripherally.

## 2022-05-08 NOTE — ED ADULT NURSE REASSESSMENT NOTE - NS ED NURSE REASSESS COMMENT FT1
Report received from Antonio/Tashi VELA. Patient in stretcher in [facility]. Patient intubated. Patient hypotensive 76/56 (MAP 63), Levo titrated to 0.12mcg/kg/min. Report received from Antonio/Tashi VELA. Patient in stretcher in [facility]. Patient intubated. Patient hypotensive 76/56 (MAP 63), Levo titrated to 0.12mcg/kg/min, all other VSS. IV's patent and flush w/o difficulty, as per MD order Levo running through IV, titrated from 0.1mcg/kg/min to 0.12mcg/kg/min. NSR on cardiac monitor. Feldman catheter in placed with stat lock, drainage bag to gravity, 1300cc's of urine drained from collection bag. Friend at bedside. Stretcher locked and in lowest position, side rails up. Pending CT scan and dispo.

## 2022-05-08 NOTE — ED PROVIDER NOTE - ATTENDING CONTRIBUTION TO CARE
Pt tx from [facility] ED s/p seizure with new frontal lobe mass seen on CT scan, c/b episode of SVT resolved spontaneously and now hypotensive on levophed drip.  PT currently unresponsive, GCS3, ETT appears in place, b/l bs without overbreathing, ab soft, nt, no response to painful stimuli, pupils fixed and dilated. NSG and Neuro consult.

## 2022-05-08 NOTE — H&P ADULT - HISTORY OF PRESENT ILLNESS
41F past medical history of migraines who presented with a witnessed seizure this morning. No evidence of trauma. Was intubated at OSH for cheyne-sokes respirations and placed on levo for hypotension. CT showed large anterior cranial fossa mass with edema. Patient has poor neurologic exam consistent with brain death vs. active seizures. Being admitted to neurosurgical ICU for further care.

## 2022-05-08 NOTE — H&P ADULT - ASSESSMENT
41F PMHx migraines p/w witnessed seizure this am, no e/o trauma. Intubated at [facility] for cheyne-sokes respirations. On levo for hypotension. CTH 3:37pm large ACF mass w/ edema, CTA neg. No heme on CTA. No EO, pupils 9NR b/l, no brain stem reflexes, no movement x4  -ADM NSCU to Dr. Armijo, q1 neurochecks  -vEEG STAT  -CO2 @ 30 with vent, continue levo prn  -3% saline, Na goal >155  -dex 10q4  -MRI brain w/wo tmrw if able

## 2022-05-08 NOTE — PROGRESS NOTE ADULT - ASSESSMENT
Summary: 40 yo female large midline mass with edema, poor exam    NEURO:  q1h neuro checks; CT brain in AM; EEG eval for sz    CARDS:  MAP >65    PULM:  sat > 92%    RENAL:  3% Na target 150-155    GASTRO:  NPO for now  ---> Stress ulcer prophylaxis:  PPI    HEME:  monitor H/H    ---> DVT prophylaxis: SCDs, hold anticoagulation tonight pending OR status    ENDO:  euglycemia    ID:  afebrile    Code status:  Full code  Disposition:  ICU    This patient was at high risk of neurologic deterioration and/or death due to: brain tumor    Time spent:  45 minutes

## 2022-05-09 PROBLEM — Z00.00 ENCOUNTER FOR PREVENTIVE HEALTH EXAMINATION: Status: ACTIVE | Noted: 2022-01-01

## 2022-05-09 NOTE — PROGRESS NOTE ADULT - SUBJECTIVE AND OBJECTIVE BOX
Patient seen and examined at bedside.    --Anticoagulation--    T(C): 38 (05-09-22 @ 03:00), Max: 38 (05-09-22 @ 03:00)  HR: 110 (05-09-22 @ 03:00) (82 - 112)  BP: 100/73 (05-08-22 @ 23:15) (60/36 - 119/91)  RR: 20 (05-09-22 @ 03:00) (16 - 22)  SpO2: 100% (05-09-22 @ 03:00) (100% - 100%)  Wt(kg): --    Exam:  Pupils 7 fixed, no CCG, no overbreathing, no dolls, flaccid x 4

## 2022-05-09 NOTE — PROGRESS NOTE ADULT - ASSESSMENT
Summary: 42 yo female large midline mass with edema, poor exam    NEURO:  q1h neuro checks; CT brain in AM; EEG eval for sz    CARDS:  MAP >65    PULM:  sat > 92%    RENAL:  3% Na target 150-155    GASTRO:  NPO for now  ---> Stress ulcer prophylaxis:  PPI    HEME:  monitor H/H    ---> DVT prophylaxis: SCDs, hold anticoagulation tonight pending OR status    ENDO:  euglycemia    ID:  afebrile    Code status:  Full code  Disposition:  ICU    This patient was at high risk of neurologic deterioration and/or death due to: brain tumor    Time spent:  45 minutes Summary: 42 yo female large midline mass with edema, 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    NEURO:  q1h neuro checks; VEEG  once on veeg will wean propofol as tolerated if no seizures  No neurosurgical intervention offered  cont keppra 1gr bid  decadron 10q4  Live on notified  GOC w family today, palliative consulted    CARDS:  MAP >65  ruthie prn  check baseline ecg    PULM:  sat > 92%  vent bundle      RENAL: D5w  bolus  Na goal 145-155  cont vaso gtt, D5w for water deficit    GASTRO:  NPO for now  ---> Stress ulcer prophylaxis:  PPI    HEME:  monitor H/H    ---> DVT prophylaxis: SCDs, hold anticoagulation     ENDO:  euglycemic goal  FSq6, ISS  hyperglycemia from decadron  check a1c, if BS >200 x2 start ins gtt    ID:  afebrile, monitor    Code status:  Full code  Disposition:  ICU    This patient was at high risk of neurologic deterioration and/or death due to: brain tumor    Time spent:  45 minutes Summary: 42 yo female large midline mass with edema, 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    NEURO:  q1h neuro checks; VEEG  once on veeg will wean propofol as tolerated if no seizures  No neurosurgical intervention offered  cont keppra 1gr bid  decadron 10q4 for cerebral edema; Na already >170  Live on notified  GOC w family today, palliative consulted    CARDS:  MAP >65  ruthie prn  check baseline ecg    PULM:  sat > 92%  vent bundle      RENAL: D5w  bolus  Na goal 145-155  cont vaso gtt, D5w for water deficit    GASTRO:  NPO for now  ---> Stress ulcer prophylaxis:  PPI    HEME:  monitor H/H    ---> DVT prophylaxis: SCDs, hold anticoagulation     ENDO:  euglycemic goal  FSq6, ISS  hyperglycemia from decadron  check a1c, if BS >200 x2 start ins gtt    ID:  afebrile, monitor    Code status:  Full code  Disposition:  ICU    This patient was at high risk of neurologic deterioration and/or death due to: brain tumor    Time spent:  45 minutes

## 2022-05-09 NOTE — CONSULT NOTE ADULT - SUBJECTIVE AND OBJECTIVE BOX
HEIDY Spence is a 41y old  Female who presents with a chief complaint of     HPI: 40 y/o woman, h/o ?migraine headaches, Transferred from [facility] for possible new onset seizure  and status epilepticus. Reported hx obtained from pt's friend Mary Faustin, who was visiting Pt from out of town, she was having headache this morning, EMS called to house but she refused medical attention, then just prior to arrival to ED Ms. Faustin found Pt lying on her bed snoring heavily with AMS and vomiting, urinary incontinence and that Pt was very "tensed up" in the fetal position on the bed. No witnessed seizure activity. There was no evidence of trauma, alcohol/drug use. No recent fever/focal weakness/numbness. No known h/o seizure. At [facility] she was intubated for airway protection and believed to be in status epilepticus. She had CTH which showed large frontal mass with surrounding edema and mass effect. There was also at that time effacement of the basilar cisterns and flattening of the brainstem. Patient received 6mg Decadron, 20mg Etomidate, 1g Keppra, 2mg Atrivan, 70mg Rocuronium. She was transferred to [facility] for possible SE management.       MEDICATIONS  (STANDING):  chlorhexidine 0.12% Liquid 15 milliLiter(s) Oral Mucosa every 12 hours  dexAMETHasone  Injectable 10 milliGRAM(s) IV Push Once  norepinephrine Infusion 0.1 MICROgram(s)/kG/Min (12.8 mL/Hr) IV Continuous <Continuous>  sodium chloride 3%. 50 milliLiter(s) (500 mL/Hr) IV Continuous <Continuous>    MEDICATIONS  (PRN):    PAST MEDICAL & SURGICAL HISTORY:  No pertinent past medical history  No significant past surgical history    FAMILY HISTORY: Unknown      SHx - No smoking, No ETOH, No drug abuse  Allergies    No Known Allergies    Intolerances      REVIEW OF SYSTEMS:    Pt unable to participate due to mental status      Vital Signs Last 24 Hrs  T(C): --  T(F): --  HR: 90 (08 May 2022 19:15) (82 - 112)  BP: 84/64 (08 May 2022 19:15) (60/36 - 119/91)  BP(mean): 72 (08 May 2022 19:15) (45 - 99)  RR: 16 (08 May 2022 19:15) (16 - 18)  SpO2: 100% (08 May 2022 19:15) (100% - 100%)    GENERAL EXAM:  Constitutional: intubated  Head: normocephalic atraumatic  Eyes: nonicteric, clear conjunctiva  Neck: Supple,   Resp: breathing with vent  Skin: no rashes or areas of discoloration  Vasc: no edema     Neurological Exam: unresponsive to any stimuli  Mental Status: Cranial Nerves:   pupils 6-7mm fixed, no VOR, no corneal reflex bilat, no gag, minimal left eye deviation with cold caloric on the left.  Motor: flaccid tone, no spontaneous movement, no withdrawal  Dysmetria: unable to assess  Tremor:  No myoclonus.  Sensation: no response to noxious stimuli  Deep Tendon Reflexes: absent throughout, plantars mut  Gait: unable to assess  Other:    05-08    140  |  110<H>  |  8   ----------------------------<  189<H>  3.5   |  14<L>  |  0.42<L>    Ca    7.4<L>      08 May 2022 18:25  Mg     1.4     05-08    TPro  6.4  /  Alb  4.1  /  TBili  0.5  /  DBili  x   /  AST  34  /  ALT  18  /  AlkPhos  50  05-08                            11.1   16.63 )-----------( 234      ( 08 May 2022 18:25 )             33.6       Radiology    CT:  from [facility]. FINDINGS:  There is no evidence of an acute intracranial hemorrhage.    There is a large extra-axial mass along the floor of the anterior cranial fossa extending into the anterior interhemispheric fissure. Mass measures approximately 5.8 cm anterior to posterior by 4.8 cm in width by 3.6 cm craniocaudad. There appears to be mild hyperostosis. There is mild   extension into the sella turcica. Large meningioma is favored.    There is moderate to large amount of edema within the adjacent frontal lobes.    There is effacement of the basal cisterns as well as flattening of the brainstem.    There is no midline shift. There is no hydrocephalus.    No subdural or epidural collection.               
HPI:  41F past medical history of migraines who presented with a witnessed seizure this morning. No evidence of trauma. Was intubated at OSH for cheyne-sokes respirations and placed on levo for hypotension. CT showed large anterior cranial fossa mass with edema. Patient has poor neurologic exam consistent with brain death vs. active seizures. Being admitted to neurosurgical ICU for further care.  (08 May 2022 20:16)    PERTINENT PM/SXH:   No pertinent past medical history      No significant past surgical history      FAMILY HISTORY:    Family Hx substance abuse [ ]yes [ ]no  ITEMS NOT CHECKED ARE NOT PRESENT    SOCIAL HISTORY:   Significant other/partner[ ]  Children[ ]  Caodaism/Spirituality:  Substance hx:  [ ]   Tobacco hx:  [ ]   Alcohol hx: [ ]   Home Opioid hx:  [ ] I-Stop Reference No:  Living Situation: [x ]Home  [ ]Long term care  [ ]Rehab [ ]Other    ADVANCE DIRECTIVES:    DNR/MOLST  [ ]  Living Will  [ ]   DECISION MAKER(s):  [ ] Health Care Proxy(s)  [x ] Surrogate(s)  [ ] Guardian           Name(s): Phone Number(s):  Mom:  Alvina Martell   sister:  Giancarlo Araya   BASELINE (I)ADL(s) (prior to admission):  [place]: [ x]Total  [ ] Moderate [ ]Dependent    Allergies    No Known Allergies    Intolerances    MEDICATIONS  (STANDING):  chlorhexidine 0.12% Liquid 15 milliLiter(s) Oral Mucosa every 12 hours  chlorhexidine 4% Liquid 1 Application(s) Topical <User Schedule>  dexAMETHasone  IVPB 10 milliGRAM(s) IV Intermittent every 4 hours  dextrose 50% Injectable 25 Gram(s) IV Push once  dextrose 50% Injectable 12.5 Gram(s) IV Push once  dextrose 50% Injectable 25 Gram(s) IV Push once  insulin lispro (ADMELOG) corrective regimen sliding scale.   SubCutaneous every 6 hours  levETIRAcetam  IVPB 1000 milliGRAM(s) IV Intermittent every 12 hours  pantoprazole  Injectable 40 milliGRAM(s) IV Push daily  phenylephrine    Infusion 4.7 MICROgram(s)/kG/Min (59.9 mL/Hr) IV Continuous <Continuous>  propofol Infusion. 40 MICROgram(s)/kG/Min (16.3 mL/Hr) IV Continuous <Continuous>  vasopressin Infusion 0.04 Unit(s)/Min (2.4 mL/Hr) IV Continuous <Continuous>    MEDICATIONS  (PRN):  bisacodyl 5 milliGRAM(s) Oral daily PRN Constipation  senna 2 Tablet(s) Oral at bedtime PRN Constipation    PRESENT SYMPTOMS: [x ]Unable to self-report  [x ] CPOT [ ] PAINADs [ ] RDOS  Source if other than patient:  [ ]Family   [ ]Team     Pain: [ ]yes [ ]no  QOL impact -   Location -                    Aggravating factors -  Quality -  Radiation -  Timing-  Severity (0-10 scale):  Minimal acceptable level (0-10 scale):     CPOT:  0  https://www.[facility].org/getattachment/[email or web address]-9v5e-5a1g-3710p2886k7y/Critical-Care-Pain-Observation-Tool-(CPOT)    PAIN AD Score:   http://geriatrictoolkit.[facility]/cog/painad.pdf (press ctrl +  left click to view)    Dyspnea:                           [ ]Mild [ ]Moderate [ ]Severe      RDOS:  0 to 2  minimal or no respiratory distress   3  mild distress  4 to 6 moderate distress  >7 severe distress  https://homecareinformation.net/handouts/hen/Respiratory_Distress_Observation_Scale.pdf (Ctrl +  left click to view)     Anxiety:                             [ ]Mild [ ]Moderate [ ]Severe  Fatigue:                             [ ]Mild [ ]Moderate [ ]Severe  Nausea:                             [ ]Mild [ ]Moderate [ ]Severe  Loss of appetite:              [ ]Mild [ ]Moderate [ x]Severe  Constipation:                    [ ]Mild [ ]Moderate [ ]Severe    Other Symptoms:  [ ]All other review of systems negative     Palliative Performance Status Version 2:         %    http://npcrc.org/files/news/palliative_performance_scale_ppsv2.pdf  PHYSICAL EXAM:  Vital Signs Last 24 Hrs  T(C): 36.9 (09 May 2022 10:00), Max: 38.1 (09 May 2022 04:00)  T(F): 98.4 (09 May 2022 10:00), Max: 100.6 (09 May 2022 04:00)  HR: 104 (09 May 2022 11:00) (82 - 116)  BP: 117/86 (09 May 2022 07:15) (60/36 - 119/91)  BP(mean): 94 (09 May 2022 07:15) (43 - 99)  RR: 20 (09 May 2022 11:00) (16 - 22)  SpO2: 100% (09 May 2022 11:00) (99% - 100%) I&O's Summary    08 May 2022 07:01  -  09 May 2022 07:00  --------------------------------------------------------  IN: 2923.9 mL / OUT: 5500 mL / NET: -2576.1 mL    09 May 2022 07:01  -  09 May 2022 12:30  --------------------------------------------------------  IN: 217.2 mL / OUT: 325 mL / NET: -107.8 mL      GENERAL: [ ]Cachexia    [ ]Alert  [ ]Oriented x   [ ]Lethargic  [ x]Unarousable  [ ]Verbal  [x ]Non-Verbal  Behavioral:   [ ] Anxiety  [ ] Delirium [ ] Agitation [ ] Other  HEENT:  [ ]Normal   [ ]Dry mouth   [ x]ET Tube/Trach  [ ]Oral lesions  PULMONARY:   [ ]Clear [ ]Tachypnea  [x ]Audible excessive secretions   [ ]Rhonchi        [ ]Right [ ]Left [ ]Bilateral  [ ]Crackles        [ ]Right [ ]Left [ ]Bilateral  [ ]Wheezing     [ ]Right [ ]Left [ ]Bilateral  [ ]Diminished breath sounds [ ]right [ ]left [ ]bilateral  CARDIOVASCULAR:    [ ]Regular [ x]Irregular [ ]Tachy  [ ]Dawson [ ]Murmur [ ]Other  GASTROINTESTINAL:  [x ]Soft  [ ]Distended   [ ]+BS  [ ]Non tender [ ]Tender  [ ]Other [ ]PEG [ ]OGT/ NGT  Last BM:  GENITOURINARY:  [ ]Normal [x ] Incontinent   [ ]Oliguria/Anuria   [ ][place]man  MUSCULOSKELETAL:   [ ]Normal   [ ]Weakness  [x ]Bed/Wheelchair bound [ ]Edema  NEUROLOGIC:   [ ]No focal deficits  [x ]Cognitive impairment  [ ]Dysphagia [ ]Dysarthria [ ]Paresis [ ]Other   SKIN:   [ ]Normal  [ ]Rash  [ ]Other  [ ]Pressure ulcer(s)       Present on admission [ ]y [ ]n    CRITICAL CARE:  [ ] Shock Present  [ ]Septic [ ]Cardiogenic [ ]Neurologic [ ]Hypovolemic  [ ]  Vasopressors [ ]  Inotropes   [x ]Respiratory failure present [x ]Mechanical ventilation [ ]Non-invasive ventilatory support [ ]High flow  Mode: AC/ CMV (Assist Control/ Continuous Mandatory Ventilation), RR (machine): 20, TV (machine): 400, FiO2: 40, PEEP: 5, ITime: 1, MAP: 10, PIP: 18  [x ]Acute  [ ]Chronic [ ]Hypoxic  [ ]Hypercarbic [ ]Other  [ ]Other organ failure     LABS:                        12.9   16.44 )-----------( 296      ( 08 May 2022 21:11 )             39.9   05-09    >170<HH>  |  >135<H>  |  8   ----------------------------<  141<H>  3.3<L>   |  11<L>  |  0.83    Ca    9.6      09 May 2022 05:33  Phos  0.8     05-08  Mg     2.3     05-08    TPro  6.4  /  Alb  4.1  /  TBili  0.5  /  DBili  x   /  AST  34  /  ALT  18  /  AlkPhos  50  05-08  PT/INR - ( 08 May 2022 21:11 )   PT: 11.8 sec;   INR: 1.03 ratio         PTT - ( 08 May 2022 21:11 )  PTT:24.6 sec      RADIOLOGY & ADDITIONAL STUDIES:    < from: CT Head No Cont (05.09.22 @ 08:52) >    ACC: [id number] EXAM:  CT BRAIN                          PROCEDURE DATE:  05/09/2022          INTERPRETATION:  Clinical indication: Head CT. Follow-up lesion.    Multiple axial sections were performed from base skull to vertex without   contrast enhancement.    This exam is compared prior head CT performed on May 8, 2022.    Extra-axial high attenuated lesion with some central vague area of   low-attenuation is again seen involving the frontal midline region. This   lesion measures approximately 5.2 x 4.9 cm and previously measured   approximately 5.2 x 5.2 cm. Surrounding edema is identified.   Transtentorial herniation is again suspected. Mass effect on the cerebral   aqueduct and fourth ventricle is again seen with rostral hydrocephalus.   The size and configuration of the ventricles appear unchanged. Mass   effect on the cerebral aqueduct and the fourth ventricle is again seen.   Evaluation of the osseous structures with the appropriate window appears   unremarkable    The visualized paranasal sinuses mastoid and middle ear regions appear   clear.    Orotracheal tubes is seen.    IMPRESSION: Abnormal lesion again seen as described above.    --- End of Report ---      < end of copied text >  < from: CT Head No Cont (05.08.22 @ 19:40) >    ACC: [id number] EXAM:  CT BRAIN                          PROCEDURE DATE:  05/08/2022          INTERPRETATION:  CLINICAL INFORMATION:  MASS    TECHNIQUE:  Axial CT images were acquired through the head.  Intravenous contrast: None  Two-dimensional reformats were generated.    COMPARISON STUDY: None    FINDINGS:    There is a large hyperdense, well-circumscribed mass which appears dural   based, centered along the floor of the anterior cranial fossa and along   the midline anterior interhemisphericfissure, which measures   approximately 5.3 x 5.5 x 3.7 cm (ap x trv x cc), and demonstrates a   central area of low attenuation, which may represent cystic change or   necrosis. There is significant local regional mass effect and surrounding   vasogenic edema, with mass effect upon the frontal horns of the bilateral   lateral ventricles, as well as diffuse cerebral sulcal effacement and   complete basilar cisternal effacement compatible with early bilateral   descending transtentorial herniation. There is also effacement of the   cerebral aqueduct, near complete effacement of the fourth ventricle,   although there is no obstructive hydrocephalus at the current time. The   foramen magnum is crowded with findings concerning for early tonsillar  herniation.    The mastoid air cells and middle ear cavities are grossly clear. The   visualized paranasal sinuses are well aerated.    The calvarium and skull base are grossly intact.    IMPRESSION:  Large, hyperdense 5.3 x 5.5 x 3.7 cm dural based mass centered along the   floor the anterior cranial fossa with significant adjacent vasogenic   edema, mass effect, including findings of early bilateral descending   transtentorial herniation and early tonsillar herniation.    Results were discussed with Dr. Phoenix by Dr. Bailey at 8:00 PM on   5/8/2022 with read back followed.    --- End of Report ---    < end of copied text >      PROTEIN CALORIE MALNUTRITION PRESENT: [ ]mild [ ]moderate [ ]severe [ ]underweight [ ]morbid obesity  https://www.andeal.org/vault/6990/web/files/ONC/Table_Clinical%20Characteristics%20to%20Document%20Malnutrition-White%20JV%20et%20al%202012.pdf    Height (cm): 157.5 (05-08-22 @ 23:30)  Weight (kg): 68 (05-08-22 @ 17:45)  BMI (kg/m2): 27.4 (05-08-22 @ 23:30)    [ ]PPSV2 < or = to 30% [ ]significant weight loss  [ ]poor nutritional intake  [ ]anasarca[ ]Artificial Nutrition      REFERRALS:   [ ]Chaplaincy  [ ]Hospice  [ ]Child Life  [ ]Social Work  [ ]Case management [ ]Holistic Therapy     Goals of Care Document:

## 2022-05-09 NOTE — CONSULT NOTE ADULT - PROBLEM SELECTOR RECOMMENDATION 5
Sister Giancarlo Araya at bedside who verbalizes understanding of catastrophic neurological event.    Awaits arrival of patients mom Alvina Martell, scheduled to arrive from [place] this afternoon.  Family meeting set up for 2:30  Possible compassionate extubation,  pending LIVE ON

## 2022-05-09 NOTE — AIRWAY REMOVAL NOTE  ADULT & PEDS - ARTIFICAL AIRWAY REMOVAL COMMENTS
Written order for extubation verified. The patient was identified by full name and birth date compared to the identification band. Present during the procedure was Negin Vigil RN and Fransico Youngblood RRT

## 2022-05-09 NOTE — DISCHARGE NOTE FOR THE EXPIRED PATIENT - OTHER SIGNIFICANT FINDINGS
< from: CT Head No Cont (05.08.22 @ 19:40) >  FINDINGS:    There is a large hyperdense, well-circumscribed mass which appears dural   based, centered along the floor of the anterior cranial fossa and along   the midline anterior interhemisphericfissure, which measures   approximately 5.3 x 5.5 x 3.7 cm (ap x trv x cc), and demonstrates a   central area of low attenuation, which may represent cystic change or   necrosis. There is significant local regional mass effect and surrounding   vasogenic edema, with mass effect upon the frontal horns of the bilateral   lateral ventricles, as well as diffuse cerebral sulcal effacement and   complete basilar cisternal effacement compatible with early bilateral   descending transtentorial herniation. There is also effacement of the   cerebral aqueduct, near complete effacement of the fourth ventricle,   although there is no obstructive hydrocephalus at the current time. The   foramen magnum is crowded with findings concerning for early tonsillar  herniation.    The mastoid air cells and middle ear cavities are grossly clear. The   visualized paranasal sinuses are well aerated.    The calvarium and skull base are grossly intact.    IMPRESSION:  Large, hyperdense 5.3 x 5.5 x 3.7 cm dural based mass centered along the   floor the anterior cranial fossa with significant adjacent vasogenic   edema, mass effect, including findings of early bilateral descending   transtentorial herniation and early tonsillar herniation.    < end of copied text >

## 2022-05-09 NOTE — CONSULT NOTE ADULT - CONVERSATION DETAILS
Met with patients mother/surrogate Alvina Martell with NSX Dr. Donna Mendoza, sister and friend also present.   Patients mother was informed of catastrophic neurological event by Dr Mendoza and unlikely recovery to any meaningful level.  It was very clear that family would not want patient to be  maintained artificially without a meaningful , quality of life existence .    Compassionate extubation to take place at bedside,  directives reviewed and DNR/DNI/ COMFORT measures established,  MOLST completed and placed in chart.   Team updated accordingly.  Chaplaincy prem

## 2022-05-09 NOTE — PROGRESS NOTE ADULT - REASON FOR ADMISSION
Brain mass with edema, poor neurologic exam

## 2022-05-09 NOTE — PROGRESS NOTE ADULT - SUBJECTIVE AND OBJECTIVE BOX
The patient had no evidence of brain function on examination done at 715 AM today. Pupils were 8 mm and unreactive, no EOM, no corneal reflexes, no gag reflex, no overbreathing of the ventilator, no eye opening, no movement in any limb to central or peripheral pain.    I discussed this with the patient's sister who was aware of the situation from prior conversations last night and today. At this point brain death examinations and then possible organ donation consultation should be considered, and I also discussed this with her sister.

## 2022-05-09 NOTE — PROGRESS NOTE ADULT - SUBJECTIVE AND OBJECTIVE BOX
HPI:  41F past medical history of migraines who presented with a witnessed seizure this morning. No evidence of trauma. Was intubated at OSH for cheyne-sokes respirations and placed on levo for hypotension. CT showed large anterior cranial fossa mass with edema. Patient has poor neurologic exam consistent with brain death vs. active seizures. Being admitted to neurosurgical ICU for further care.  (08 May 2022 20:16)    On admission, the patient was:  GCS: 3T    *** HIGH RISK OF DVT PRESENT ON ADMISSION ***    VITALS:  T(C): --  HR:  (82 - 112)  BP:  (60/36 - 119/91)  ABP: --  RR:  (16 - 22)  SpO2:  (100% - 100%)  Wt(kg): --  Device: Avea, Mode: AC/ CMV (Assist Control/ Continuous Mandatory Ventilation), RR (machine): 16, TV (machine): 400, FiO2: 100, PEEP: 5, ITime: 1, MAP: 9, PIP: 18    LABS:  Na: 140 (05-08 @ 18:25)  K: 3.5 (05-08 @ 18:25)  Cl: 110 (05-08 @ 18:25)  CO2: 14 (05-08 @ 18:25)  BUN: 8 (05-08 @ 18:25)  Cr: 0.42 (05-08 @ 18:25)  Glu: 189(05-08 @ 18:25)    Hgb: 11.1 (05-08 @ 18:25)  Hct: 33.6 (05-08 @ 18:25)  WBC: 16.63 (05-08 @ 18:25)  Plt: 234 (05-08 @ 18:25)  PT: 12.2 (05-08 @ 18:25)  INR: 1.06 (05-08 @ 18:25)  aPTT: 24.2 (05-08 @ 18:25)    IMAGING:   Recent imaging studies were reviewed.    MEDICATIONS:  bisacodyl 5 milliGRAM(s) Oral daily PRN  chlorhexidine 0.12% Liquid 15 milliLiter(s) Oral Mucosa every 12 hours  dexAMETHasone  Injectable 10 milliGRAM(s) IV Push Once  dexAMETHasone  IVPB 10 milliGRAM(s) IV Intermittent every 4 hours  norepinephrine Infusion 0.1 MICROgram(s)/kG/Min IV Continuous <Continuous>  pantoprazole  Injectable 40 milliGRAM(s) IV Push daily  senna 2 Tablet(s) Oral at bedtime PRN  sodium chloride 3%. 50 milliLiter(s) IV Continuous <Continuous>    EXAMINATION:  General:  comatose  HEENT:  MMM  Neuro:  does not open eyes, does not follow, no corneals, no cough, no gag, flaccid x 4, intermittently over-breathes  Cards:  RRR  Respiratory:  intubated  Adomen:  soft  Extremities:  no edema  Skin:  warm/dry HPI:  41F past medical history of migraines who presented with a witnessed seizure this morning. No evidence of trauma. Was intubated at OSH for cheyne-sokes respirations and placed on levo for hypotension. CT showed large anterior cranial fossa mass with edema. Patient has poor neurologic exam consistent with brain death vs. active seizures. Being admitted to neurosurgical ICU for further care.  (08 May 2022 20:16)    On admission, the patient was:  GCS: 3T    *** HIGH RISK OF DVT PRESENT ON ADMISSION ***    Overnight events: Pt remained with poor neuro exam. UOP high, going into DI likely. Na elevated, started on vaso, family present for GOC    IMAGING:   Recent imaging studies were reviewed.    MEDICATIONS:  bisacodyl 5 milliGRAM(s) Oral daily PRN  chlorhexidine 0.12% Liquid 15 milliLiter(s) Oral Mucosa every 12 hours  dexAMETHasone  Injectable 10 milliGRAM(s) IV Push Once  dexAMETHasone  IVPB 10 milliGRAM(s) IV Intermittent every 4 hours  norepinephrine Infusion 0.1 MICROgram(s)/kG/Min IV Continuous <Continuous>  pantoprazole  Injectable 40 milliGRAM(s) IV Push daily  senna 2 Tablet(s) Oral at bedtime PRN  sodium chloride 3%. 50 milliLiter(s) IV Continuous <Continuous>    EXAMINATION:  General:  comatose  HEENT:  MMM  Neuro:  does not open eyes, does not follow, no corneals, no cough, no gag, flaccid x 4, intermittently over-breathes  Cards:  RRR  Respiratory:  intubated  Adomen:  soft  Extremities:  no edema  Skin:  warm/dry HPI:  41F past medical history of migraines who presented with a witnessed seizure this morning. No evidence of trauma. Was intubated at OSH for cheyne-sokes respirations and placed on levo for hypotension. CT showed large anterior cranial fossa mass with edema. Patient has poor neurologic exam consistent with brain death vs. active seizures. Being admitted to neurosurgical ICU for further care.  (08 May 2022 20:16)    On admission, the patient was:  GCS: 3T    *** HIGH RISK OF DVT PRESENT ON ADMISSION ***    Overnight events: Pt remained with poor neuro exam. UOP high, going into DI likely. Na elevated, started on vaso, family present for C    IMAGING:   Recent imaging studies were reviewed.    MEDICATIONS:  bisacodyl 5 milliGRAM(s) Oral daily PRN  chlorhexidine 0.12% Liquid 15 milliLiter(s) Oral Mucosa every 12 hours  dexAMETHasone  Injectable 10 milliGRAM(s) IV Push Once  dexAMETHasone  IVPB 10 milliGRAM(s) IV Intermittent every 4 hours  norepinephrine Infusion 0.1 MICROgram(s)/kG/Min IV Continuous <Continuous>  pantoprazole  Injectable 40 milliGRAM(s) IV Push daily  senna 2 Tablet(s) Oral at bedtime PRN  sodium chloride 3%. 50 milliLiter(s) IV Continuous <Continuous>    EXAMINATION:  General:  comatose  HEENT:  MMM  Neuro: intubated, aox0, does not open eyes to nox, does not follow, no corneals, no cough, no gag, flaccid x 4, intermittently over-breathes  Cards:  RRR  Respiratory:  intubated  Adomen:  soft  Extremities:  no edema  Skin:  warm/dry HPI:  41F past medical history of migraines who presented with a witnessed seizure this morning. No evidence of trauma. Was intubated at OSH for cheyne-sokes respirations and placed on levo for hypotension. CT showed large anterior cranial fossa mass with edema. Patient has poor neurologic exam consistent with brain death vs. active seizures. Being admitted to neurosurgical ICU for further care.  (08 May 2022 20:16)    On admission, the patient was:  GCS: 3T    *** HIGH RISK OF DVT PRESENT ON ADMISSION ***    Overnight events: Pt remained with poor neuro exam. UOP high, going into DI likely. Na elevated, started on vaso gtt, family present for GOC    IMAGING:   Recent imaging studies were reviewed.    MEDICATIONS:  bisacodyl 5 milliGRAM(s) Oral daily PRN  chlorhexidine 0.12% Liquid 15 milliLiter(s) Oral Mucosa every 12 hours  dexAMETHasone  Injectable 10 milliGRAM(s) IV Push Once  dexAMETHasone  IVPB 10 milliGRAM(s) IV Intermittent every 4 hours  norepinephrine Infusion 0.1 MICROgram(s)/kG/Min IV Continuous <Continuous>  pantoprazole  Injectable 40 milliGRAM(s) IV Push daily  senna 2 Tablet(s) Oral at bedtime PRN  sodium chloride 3%. 50 milliLiter(s) IV Continuous <Continuous>    EXAMINATION:  General:  comatose  HEENT:  MMM  Neuro: intubated, aox0, does not open eyes to nox, does not follow, no corneals, no cough, no gag, flaccid x 4, intermittently over-breathes  Cards:  RRR  Respiratory:  intubated  Adomen:  soft  Extremities:  no edema  Skin:  warm/dry HPI:  41F past medical history of migraines who presented with a witnessed seizure 5/8/2022. No evidence of trauma. Was intubated at OSH for cheyne-sokes respirations and placed on levo for hypotension. CT showed large anterior cranial fossa mass with edema. Patient has poor neurologic exam consistent with brain death vs. active seizures. Being admitted to neurosurgical ICU for further care.      On admission, the patient was:  GCS: 3T    *** HIGH RISK OF DVT PRESENT ON ADMISSION ***    Overnight events: Pt remained with poor neuro exam. UOP high, going into DI likely. Na elevated, started on vaso gtt, family present for GOC but awaiting mother    IMAGING:   Recent imaging studies were reviewed.    MEDICATIONS:  bisacodyl 5 milliGRAM(s) Oral daily PRN  chlorhexidine 0.12% Liquid 15 milliLiter(s) Oral Mucosa every 12 hours  dexAMETHasone  Injectable 10 milliGRAM(s) IV Push Once  dexAMETHasone  IVPB 10 milliGRAM(s) IV Intermittent every 4 hours  norepinephrine Infusion 0.1 MICROgram(s)/kG/Min IV Continuous <Continuous>  pantoprazole  Injectable 40 milliGRAM(s) IV Push daily  senna 2 Tablet(s) Oral at bedtime PRN  sodium chloride 3%. 50 milliLiter(s) IV Continuous <Continuous>    EXAMINATION:  General:  comatose  HEENT:  MMM  Neuro: intubated, aox0, does not open eyes to nox, does not follow, no corneals, no cough, no gag, flaccid x 4, intermittently over-breathes vent set rate  Cards:  RRR  Respiratory:  intubated  Adomen:  soft  Extremities:  no edema  Skin:  warm/dry

## 2022-05-09 NOTE — CHART NOTE - NSCHARTNOTEFT_GEN_A_CORE
CAPRINI SCORE [CLOT] Score on Admission for     AGE RELATED RISK FACTORS                                                       MOBILITY RELATED FACTORS  [X ] Age 41-60 years                                            (1 Point)                  [X ] Bed rest                                                        (1 Point)  [ ] Age: 61-74 years                                           (2 Points)                 [ ] Plaster cast                                                   (2 Points)  [ ] Age= 75 years                                              (3 Points)                 [ ] Bed bound for more than 72 hours                 (2 Points)    DISEASE RELATED RISK FACTORS                                               GENDER SPECIFIC FACTORS  [ ] Edema in the lower extremities                       (1 Point)                  [ ] Pregnancy                                                     (1 Point)  [ ] Varicose veins                                               (1 Point)                  [ ] Post-partum < 6 weeks                                   (1 Point)             [X ] BMI > 25 Kg/m2                                            (1 Point)                  [ ] Hormonal therapy  or oral contraception          (1 Point)                 [ ] Sepsis (in the previous month)                        (1 Point)                  [ ] History of pregnancy complications                 (1 point)  [ ] Pneumonia or serious lung disease                                               [ ] Unexplained or recurrent                     (1 Point)           (in the previous month)                               (1 Point)  [ ] Abnormal pulmonary function test                     (1 Point)                 SURGERY RELATED RISK FACTORS (include planned surgeries)  [ ] Acute myocardial infarction                              (1 Point)                 [ ]  Section                                             (1 Point)  [ ] Congestive heart failure (in the previous month)  (1 Point)         [ ] Minor surgery                                                  (1 Point)   [ ] Inflammatory bowel disease                             (1 Point)                 [ ] Arthroscopic surgery                                        (2 Points)  [ ] Central venous access                                      (2 Points)                [ ] General surgery lasting more than 45 minutes   (2 Points)       [ ] Stroke (in the previous month)                          (5 Points)               [ ] Elective arthroplasty                                         (5 Points)            [ ] current or past malignancy                              (2 Points)                                                                                                       HEMATOLOGY RELATED FACTORS                                                 TRAUMA RELATED RISK FACTORS  [ ] Prior episodes of VTE                                     (3 Points)                [ ] Fracture of the hip, pelvis, or leg                       (5 Points)  [ ] Positive family history for VTE                         (3 Points)                 [ ] Acute spinal cord injury (in the previous month)  (5 Points)  [ ] Prothrombin 70291 A                                     (3 Points)                 [ ] Paralysis  (less than 1 month)                             (5 Points)  [ ] Factor V Leiden                                             (3 Points)                  [ ] Multiple Trauma within 1 month                        (5 Points)  [ ] Lupus anticoagulants                                     (3 Points)                                                           [ ] Anticardiolipin antibodies                               (3 Points)                                                       [ ] High homocysteine in the blood                      (3 Points)                                             [ ] Other congenital or acquired thrombophilia      (3 Points)                                                [ ] Heparin induced thrombocytopenia                  (3 Points)                                          Total Score [   3      ]    Risk:  Very low 0   Low 1 to 2   Moderate 3 to 4   High =5       VTE Prophylasix Recommednations:  [ X] mechanical pneumatic compression devices                                      [ ] contraindicated: _____________________  [ ] chemo prophylasix                                                                                   [X ] contraindicated _____________________    **** HIGH LIKELIHOOD DVT PRESENT ON ADMISSION  [ ] (please order LE dopplers within 24 hours of admission)
[facility] COMPREHENSIVE EPILEPSY CENTER    ** PRELIMINARY EEG reviewed until  11:18    - Diffusely suppressed, unreactive EEG background concerning for global acute-subacute cortical injury.   - No seizures recorded so far.    d/w Behzad Jimenez    Final report to be produced after completion of the study tomorrow morning.    -----------------------------  Jacob Wagner MD, MBA  Epilepsy Fellow    --------------------------------  EEG Reading Room: [phone]  (weekdays)  On Call Service After Hours: [phone]

## 2022-05-09 NOTE — DISCHARGE NOTE FOR THE EXPIRED PATIENT - HOSPITAL COURSE
41F past medical history of migraines who presented with a witnessed seizure this morning. No evidence of trauma. Was intubated at OSH for cheyne-sokes respirations and placed on levo for hypotension. CT showed large anterior cranial fossa mass with edema. Patient has poor neurologic exam consistent with brain death vs. active seizures. Being admitted to neurosurgical ICU for further care.  Patient arrived intubated with poor neurological exam.  Patient was placed on EEG for monitoring to rule out seizures, EEG consistent with no activity.  Patient's family at bedside, palliative consulted and discussed patients medical condition with neurosurgical team, family and NSCU team.  Family understands prognosis and decided for compassionate extubation with palliative following.  Patient extubated and passed away 16:17.

## 2022-05-09 NOTE — CONSULT NOTE ADULT - ASSESSMENT
This is a 40 y/o woman with no known PMH other than possible migraine HA, p/w headache earlier today and then found down at home unresponsive with suspected seizure. She was found to have large frontal mass most likely meningioma. On arrival to [facility] ED she was unresponsive not on any sedation. On her neurologic exam she has absent brainstem signs. On review of CTH there was already sign of effacement of the ventricles and brainstem herniation.     Impression: frontal meningioma, effacement of the 4th ventricle and basilar cistern c/w herniation and severe cerebral dysfunction most likely clinical brain death.     [] Pt to be transferred to the NSCU for further management  [] Repeat CTH pending  [] Awaiting family arrival from [place] for GOC discussion.   
42 y/o presents with witnessed seizure, CTH with large anterior cranial fossa meningioma.  Palliative called for advance care planning

## 2022-05-09 NOTE — EEG REPORT - NS EEG TEXT BOX
[facility]   COMPREHENSIVE EPILEPSY CENTER   REPORT OF ROUTINE VIDEO EEG     [facility]: [address] [place] [address] , [address], [place] [address], Ph#: [phone]  [facility]: [address], [place] [address], Ph#: [phone]  [facility]: [address] [place] [address], Ph#: [phone]    Patient Name: HEIDY MUKHERJEE  Age and : 41y (76)  MRN #: [id number]  Location: [place] [address]  Referring Physician: Fran Armijo    Study Date: 22    _____________________________________________________________  TECHNICAL INFORMATION    Placement and Labeling of Electrodes:  The EEG was performed utilizing 20 channels referential EEG connections (coronal over temporal over parasagittal montage) using all standard 10-20 electrode placements with EKG.  Recording was at a sampling rate of 256 samples per second per channel.  Time synchronized digital video recording was done simultaneously with EEG recording.  A low light infrared camera was used for low light recording.  Brandon and seizure detection algorithms were utilized.    _____________________________________________________________  HISTORY    Patient is a 41y old  Female who presents with a chief complaint of Brain mass with edema, poor neurologic exam (09 May 2022 16:27)      PERTINENT MEDICATION:  levETIRAcetam  IVPB 1000 milliGRAM(s) IV Intermittent every 12 hours  _____________________________________________________________  STUDY INTERPRETATION    Findings: The background was diffusely suppressed and nonreactive. No posterior dominant rhythm seen.    Sleep Background:  Drowsiness and stage II sleep transients were not recorded.    Other Non-Epileptiform Findings:  None were present.    Interictal Epileptiform Activity:   None were present.    Events:  Clinical events: None recorded.  Seizures: None recorded.    Activation Procedures:   Hyperventilation was not performed.    Photic stimulation was not performed.     Artifacts:  Intermittent myogenic and movement artifacts were noted.    ECG:  The heart rate on single channel ECG was predominantly between 70-90 BPM.    _____________________________________________________________  **EEG SUMMARY/CLASSIFICATION**    Abnormal EEG in a comatose patient.    - Profound diffuse background suppression.    _____________________________________________________________  **EEG IMPRESSION/CLINICAL CORRELATE**    Abnormal EEG study.    - Severe nonspecific diffuse or multifocal cerebral dysfunction.   - Suppression of background activity is concerning for acute to subacute cortical injury.  - No epileptiform patterns or seizures recorded.    _____________________________________________________________    Jacob Wagner MD, LINDA  Fellow | [facility] Epilepsy [place]     [facility]   COMPREHENSIVE EPILEPSY CENTER   REPORT OF CONTINUOUS VIDEO EEG     [facility]: [address] [place] [address] [address], [place] [address], Ph#: [phone]  [facility]: [address], [place] [address], Ph#: [phone]  [facility]: [address] [place] [address], Ph#: [phone]    Patient Name: HEIDY MUKHERJEE  Age and : 41y (76)  MRN #: [id number]  Location: [place] [address]  Referring Physician: Fran Armijo    Start Time: 09:00 on  2022  End Time: 16:00 on 2022  Duration: 06 hours    _____________________________________________________________  TECHNICAL INFORMATION    Placement and Labeling of Electrodes:  The EEG was performed utilizing 20 channels referential EEG connections (coronal over temporal over parasagittal montage) using all standard 10-20 electrode placements with EKG.  Recording was at a sampling rate of 256 samples per second per channel.  Time synchronized digital video recording was done simultaneously with EEG recording.  A low light infrared camera was used for low light recording.  Brandon and seizure detection algorithms were utilized.    _____________________________________________________________  HISTORY    Patient is a 41y old  Female who presents with a chief complaint of Brain mass with edema, poor neurologic exam (09 May 2022 16:27)      PERTINENT MEDICATION:  levETIRAcetam  IVPB 1000 milliGRAM(s) IV Intermittent every 12 hours  _____________________________________________________________  STUDY INTERPRETATION    Findings: The background was diffusely suppressed and nonreactive. No posterior dominant rhythm seen.    Sleep Background:  Drowsiness and stage II sleep transients were not recorded.    Other Non-Epileptiform Findings:  None were present.    Interictal Epileptiform Activity:   None were present.    Events:  Clinical events: None recorded.  Seizures: None recorded.    Activation Procedures:   Hyperventilation was not performed.    Photic stimulation was not performed.     Artifacts:  Intermittent myogenic and movement artifacts were noted.    ECG:  The heart rate on single channel ECG was predominantly between 70-90 BPM.    _____________________________________________________________  **EEG SUMMARY/CLASSIFICATION**    Abnormal EEG in a comatose patient.    - Profound diffuse background suppression.    _____________________________________________________________  **EEG IMPRESSION/CLINICAL CORRELATE**    Abnormal EEG study.    - Severe nonspecific diffuse or multifocal cerebral dysfunction.   - Suppression of background activity is concerning for acute to subacute cortical injury.  - No epileptiform patterns or seizures recorded.    _____________________________________________________________    Jacob Wagner MD, LINDA  Fellow | [facility] Epilepsy [place]     [facility]   COMPREHENSIVE EPILEPSY CENTER   REPORT OF CONTINUOUS VIDEO EEG     [facility]: [address] [place] [address] [address], [place] [address], Ph#: [phone]  [facility]: [address], [place] [address], Ph#: [phone]  [facility]: [address] [place] [address], Ph#: [phone]    Patient Name: HEIDY MUKHERJEE  Age and : 41y (76)  MRN #: [id number]  Location: [place] [address]  Referring Physician: Fran Armijo    Start Time: 09:00 on  2022  End Time: 16:00 on 2022  Duration: 06 hours    _____________________________________________________________  TECHNICAL INFORMATION    Placement and Labeling of Electrodes:  The EEG was performed utilizing 20 channels referential EEG connections (coronal over temporal over parasagittal montage) using all standard 10-20 electrode placements with EKG.  Recording was at a sampling rate of 256 samples per second per channel.  Time synchronized digital video recording was done simultaneously with EEG recording.  A low light infrared camera was used for low light recording.  Brandon and seizure detection algorithms were utilized.    _____________________________________________________________  HISTORY    Patient is a 41y old  Female who presents with a chief complaint of Brain mass with edema, poor neurologic exam (09 May 2022 16:27)      PERTINENT MEDICATION:  levETIRAcetam  IVPB 1000 milliGRAM(s) IV Intermittent every 12 hours  _____________________________________________________________  STUDY INTERPRETATION    Findings: The background was diffusely suppressed and nonreactive. No posterior dominant rhythm seen.    Sleep Background:  Drowsiness and stage II sleep transients were not recorded.    Other Non-Epileptiform Findings:  None were present.    Interictal Epileptiform Activity:   None were present.    Events:  Clinical events: None recorded.  Seizures: None recorded.    Activation Procedures:   Hyperventilation was not performed.    Photic stimulation was not performed.     Artifacts:  Intermittent myogenic and movement artifacts were noted.    ECG:  The heart rate on single channel ECG was predominantly between 70-90 BPM.    _____________________________________________________________  **EEG SUMMARY/CLASSIFICATION**    Abnormal EEG in a comatose patient.    - Profound diffuse background suppression.    _____________________________________________________________  **EEG IMPRESSION/CLINICAL CORRELATE**    Abnormal EEG study.    - Severe nonspecific diffuse or multifocal cerebral dysfunction.   - Suppression of background activity is concerning for acute to subacute cortical injury.  - No epileptiform patterns or seizures recorded.    _____________________________________________________________    Jacob Wagner MD, LINDA  Fellow | [facility]    Francisco Cline MD  EEG/Epilepsy Attending

## 2022-05-09 NOTE — PROGRESS NOTE ADULT - ATTENDING COMMENTS
EEG on. Off propofol. Continue to monitor. Exam unchanged. Optimize Na+ in setting of DI. Updated sister, awaiting mother.   Appreciate Palliative care support.     AT risk of death/neuro decline due to: cerebral edema/brain compression, acute resp failure

## 2022-08-11 PROCEDURE — U0003: CPT

## 2022-08-11 PROCEDURE — 36415 COLL VENOUS BLD VENIPUNCTURE: CPT

## 2022-08-11 PROCEDURE — 86850 RBC ANTIBODY SCREEN: CPT

## 2022-08-11 PROCEDURE — 86900 BLOOD TYPING SEROLOGIC ABO: CPT

## 2022-08-11 PROCEDURE — 80048 BASIC METABOLIC PNL TOTAL CA: CPT

## 2022-08-11 PROCEDURE — 85025 COMPLETE CBC W/AUTO DIFF WBC: CPT

## 2022-08-11 PROCEDURE — 83036 HEMOGLOBIN GLYCOSYLATED A1C: CPT

## 2022-08-11 PROCEDURE — 80053 COMPREHEN METABOLIC PANEL: CPT

## 2022-08-11 PROCEDURE — 84702 CHORIONIC GONADOTROPIN TEST: CPT

## 2022-08-11 PROCEDURE — 85018 HEMOGLOBIN: CPT

## 2022-08-11 PROCEDURE — 96375 TX/PRO/DX INJ NEW DRUG ADDON: CPT

## 2022-08-11 PROCEDURE — 70450 CT HEAD/BRAIN W/O DYE: CPT | Mod: MA

## 2022-08-11 PROCEDURE — 82565 ASSAY OF CREATININE: CPT

## 2022-08-11 PROCEDURE — 95700 EEG CONT REC W/VID EEG TECH: CPT

## 2022-08-11 PROCEDURE — 83930 ASSAY OF BLOOD OSMOLALITY: CPT

## 2022-08-11 PROCEDURE — 84100 ASSAY OF PHOSPHORUS: CPT

## 2022-08-11 PROCEDURE — 99285 EMERGENCY DEPT VISIT HI MDM: CPT

## 2022-08-11 PROCEDURE — 85610 PROTHROMBIN TIME: CPT

## 2022-08-11 PROCEDURE — 94003 VENT MGMT INPAT SUBQ DAY: CPT

## 2022-08-11 PROCEDURE — 81005 URINALYSIS: CPT

## 2022-08-11 PROCEDURE — 83735 ASSAY OF MAGNESIUM: CPT

## 2022-08-11 PROCEDURE — 85014 HEMATOCRIT: CPT

## 2022-08-11 PROCEDURE — U0005: CPT

## 2022-08-11 PROCEDURE — 95711 VEEG 2-12 HR UNMONITORED: CPT

## 2022-08-11 PROCEDURE — 82803 BLOOD GASES ANY COMBINATION: CPT

## 2022-08-11 PROCEDURE — 82947 ASSAY GLUCOSE BLOOD QUANT: CPT

## 2022-08-11 PROCEDURE — 82962 GLUCOSE BLOOD TEST: CPT

## 2022-08-11 PROCEDURE — 84132 ASSAY OF SERUM POTASSIUM: CPT

## 2022-08-11 PROCEDURE — 93970 EXTREMITY STUDY: CPT

## 2022-08-11 PROCEDURE — 84295 ASSAY OF SERUM SODIUM: CPT

## 2022-08-11 PROCEDURE — 83605 ASSAY OF LACTIC ACID: CPT

## 2022-08-11 PROCEDURE — 82330 ASSAY OF CALCIUM: CPT

## 2022-08-11 PROCEDURE — 85730 THROMBOPLASTIN TIME PARTIAL: CPT

## 2022-08-11 PROCEDURE — 96374 THER/PROPH/DIAG INJ IV PUSH: CPT

## 2022-08-11 PROCEDURE — 85027 COMPLETE CBC AUTOMATED: CPT

## 2022-08-11 PROCEDURE — 82435 ASSAY OF BLOOD CHLORIDE: CPT

## 2022-08-11 PROCEDURE — 86901 BLOOD TYPING SEROLOGIC RH(D): CPT

## 2022-09-07 NOTE — ED PROCEDURE NOTE - CPROC ED TOLERANCE1
HR=77 bpm, MGGX=866/62 mmhg, SpO2=98 %, Resp=15 B/min, EtCO2=40 mmHg, Apnea=3 Seconds, Pain=0, Jayce=10, Bacon=2 Patient tolerated procedure well.
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
## 2023-03-03 NOTE — ED ADULT NURSE NOTE - NS ED NURSE DISCH DISPOSITION
Transferred Rinvoq Pregnancy And Lactation Text: Based on animal studies, Rinvoq may cause embryo-fetal harm when administered to pregnant women.  The medication should not be used in pregnancy.  Breastfeeding is not recommended during treatment and for 6 days after the last dose.